# Patient Record
Sex: MALE | Race: OTHER | HISPANIC OR LATINO | URBAN - METROPOLITAN AREA
[De-identification: names, ages, dates, MRNs, and addresses within clinical notes are randomized per-mention and may not be internally consistent; named-entity substitution may affect disease eponyms.]

---

## 2021-10-21 ENCOUNTER — EMERGENCY (EMERGENCY)
Facility: HOSPITAL | Age: 46
LOS: 1 days | Discharge: ANOTHER HOSPITAL | End: 2021-10-21
Attending: EMERGENCY MEDICINE
Payer: MEDICAID

## 2021-10-21 VITALS
HEART RATE: 45 BPM | OXYGEN SATURATION: 98 % | TEMPERATURE: 98 F | DIASTOLIC BLOOD PRESSURE: 50 MMHG | WEIGHT: 260.15 LBS | RESPIRATION RATE: 18 BRPM | SYSTOLIC BLOOD PRESSURE: 80 MMHG

## 2021-10-21 PROCEDURE — 99291 CRITICAL CARE FIRST HOUR: CPT

## 2021-10-21 PROCEDURE — 93010 ELECTROCARDIOGRAM REPORT: CPT

## 2021-10-22 ENCOUNTER — INPATIENT (INPATIENT)
Facility: HOSPITAL | Age: 46
LOS: 0 days | Discharge: HOME | DRG: 308 | End: 2021-10-23
Attending: INTERNAL MEDICINE | Admitting: INTERNAL MEDICINE
Payer: MEDICAID

## 2021-10-22 ENCOUNTER — FORM ENCOUNTER (OUTPATIENT)
Age: 46
End: 2021-10-22

## 2021-10-22 VITALS
OXYGEN SATURATION: 96 % | DIASTOLIC BLOOD PRESSURE: 49 MMHG | SYSTOLIC BLOOD PRESSURE: 97 MMHG | RESPIRATION RATE: 16 BRPM

## 2021-10-22 VITALS
WEIGHT: 266.98 LBS | HEIGHT: 68 IN | SYSTOLIC BLOOD PRESSURE: 91 MMHG | RESPIRATION RATE: 22 BRPM | OXYGEN SATURATION: 98 % | HEART RATE: 48 BPM | DIASTOLIC BLOOD PRESSURE: 55 MMHG

## 2021-10-22 DIAGNOSIS — R00.1 BRADYCARDIA, UNSPECIFIED: ICD-10-CM

## 2021-10-22 LAB
A1C WITH ESTIMATED AVERAGE GLUCOSE RESULT: 6.5 % — HIGH (ref 4–5.6)
ALBUMIN SERPL ELPH-MCNC: 2.9 G/DL — LOW (ref 3.5–5)
ALBUMIN SERPL ELPH-MCNC: 3.6 G/DL — SIGNIFICANT CHANGE UP (ref 3.3–5)
ALP SERPL-CCNC: 107 U/L — SIGNIFICANT CHANGE UP (ref 40–120)
ALP SERPL-CCNC: 109 U/L — SIGNIFICANT CHANGE UP (ref 40–120)
ALT FLD-CCNC: 149 U/L — HIGH (ref 10–45)
ALT FLD-CCNC: 161 U/L DA — HIGH (ref 10–60)
ANION GAP SERPL CALC-SCNC: 13 MMOL/L — SIGNIFICANT CHANGE UP (ref 5–17)
ANION GAP SERPL CALC-SCNC: 8 MMOL/L — SIGNIFICANT CHANGE UP (ref 5–17)
APTT BLD: 27.2 SEC — LOW (ref 27.5–35.5)
APTT BLD: 29 SEC — SIGNIFICANT CHANGE UP (ref 27.5–35.5)
AST SERPL-CCNC: 72 U/L — HIGH (ref 10–40)
AST SERPL-CCNC: 91 U/L — HIGH (ref 10–40)
BASOPHILS # BLD AUTO: 0.08 K/UL — SIGNIFICANT CHANGE UP (ref 0–0.2)
BASOPHILS NFR BLD AUTO: 0.5 % — SIGNIFICANT CHANGE UP (ref 0–2)
BILIRUB SERPL-MCNC: 0.5 MG/DL — SIGNIFICANT CHANGE UP (ref 0.2–1.2)
BILIRUB SERPL-MCNC: 0.5 MG/DL — SIGNIFICANT CHANGE UP (ref 0.2–1.2)
BLD GP AB SCN SERPL QL: NEGATIVE — SIGNIFICANT CHANGE UP
BUN SERPL-MCNC: 25 MG/DL — HIGH (ref 7–18)
BUN SERPL-MCNC: 28 MG/DL — HIGH (ref 7–23)
CALCIUM SERPL-MCNC: 8.7 MG/DL — SIGNIFICANT CHANGE UP (ref 8.4–10.5)
CALCIUM SERPL-MCNC: 8.8 MG/DL — SIGNIFICANT CHANGE UP (ref 8.4–10.5)
CHLORIDE SERPL-SCNC: 106 MMOL/L — SIGNIFICANT CHANGE UP (ref 96–108)
CHLORIDE SERPL-SCNC: 109 MMOL/L — HIGH (ref 96–108)
CHOLEST SERPL-MCNC: 146 MG/DL — SIGNIFICANT CHANGE UP
CK MB BLD-MCNC: 2.2 % — SIGNIFICANT CHANGE UP (ref 0–3.5)
CK MB CFR SERPL CALC: 2.9 NG/ML — SIGNIFICANT CHANGE UP (ref 0–6.7)
CK SERPL-CCNC: 132 U/L — SIGNIFICANT CHANGE UP (ref 30–200)
CO2 SERPL-SCNC: 17 MMOL/L — LOW (ref 22–31)
CO2 SERPL-SCNC: 23 MMOL/L — SIGNIFICANT CHANGE UP (ref 22–31)
COVID-19 NUCLEOCAPSID GAM AB INTERP: NEGATIVE — SIGNIFICANT CHANGE UP
COVID-19 NUCLEOCAPSID TOTAL GAM ANTIBODY RESULT: 0.06 INDEX — SIGNIFICANT CHANGE UP
COVID-19 SPIKE DOMAIN AB INTERP: POSITIVE
COVID-19 SPIKE DOMAIN ANTIBODY RESULT: 176 U/ML — HIGH
CREAT SERPL-MCNC: 2.14 MG/DL — HIGH (ref 0.5–1.3)
CREAT SERPL-MCNC: 2.66 MG/DL — HIGH (ref 0.5–1.3)
EOSINOPHIL # BLD AUTO: 0.27 K/UL — SIGNIFICANT CHANGE UP (ref 0–0.5)
EOSINOPHIL NFR BLD AUTO: 1.8 % — SIGNIFICANT CHANGE UP (ref 0–6)
ESTIMATED AVERAGE GLUCOSE: 140 MG/DL — HIGH (ref 68–114)
GAS PNL BLDA: SIGNIFICANT CHANGE UP
GAS PNL BLDA: SIGNIFICANT CHANGE UP
GLUCOSE BLDC GLUCOMTR-MCNC: 137 MG/DL — HIGH (ref 70–99)
GLUCOSE SERPL-MCNC: 138 MG/DL — HIGH (ref 70–99)
GLUCOSE SERPL-MCNC: 169 MG/DL — HIGH (ref 70–99)
HCT VFR BLD CALC: 38.8 % — LOW (ref 39–50)
HCT VFR BLD CALC: 39.4 % — SIGNIFICANT CHANGE UP (ref 39–50)
HDLC SERPL-MCNC: 38 MG/DL — LOW
HGB BLD-MCNC: 12.6 G/DL — LOW (ref 13–17)
HGB BLD-MCNC: 13 G/DL — SIGNIFICANT CHANGE UP (ref 13–17)
IMM GRANULOCYTES NFR BLD AUTO: 1 % — SIGNIFICANT CHANGE UP (ref 0–1.5)
INR BLD: 1.04 RATIO — SIGNIFICANT CHANGE UP (ref 0.88–1.16)
INR BLD: 1.07 RATIO — SIGNIFICANT CHANGE UP (ref 0.88–1.16)
LACTATE SERPL-SCNC: 2.2 MMOL/L — HIGH (ref 0.7–2)
LIDOCAIN IGE QN: 205 U/L — SIGNIFICANT CHANGE UP (ref 73–393)
LIPID PNL WITH DIRECT LDL SERPL: 86 MG/DL — SIGNIFICANT CHANGE UP
LYMPHOCYTES # BLD AUTO: 18.6 % — SIGNIFICANT CHANGE UP (ref 13–44)
LYMPHOCYTES # BLD AUTO: 2.85 K/UL — SIGNIFICANT CHANGE UP (ref 1–3.3)
MAGNESIUM SERPL-MCNC: 2.1 MG/DL — SIGNIFICANT CHANGE UP (ref 1.6–2.6)
MAGNESIUM SERPL-MCNC: 2.3 MG/DL — SIGNIFICANT CHANGE UP (ref 1.6–2.6)
MCHC RBC-ENTMCNC: 27.9 PG — SIGNIFICANT CHANGE UP (ref 27–34)
MCHC RBC-ENTMCNC: 28.1 PG — SIGNIFICANT CHANGE UP (ref 27–34)
MCHC RBC-ENTMCNC: 32.5 GM/DL — SIGNIFICANT CHANGE UP (ref 32–36)
MCHC RBC-ENTMCNC: 33 GM/DL — SIGNIFICANT CHANGE UP (ref 32–36)
MCV RBC AUTO: 85.3 FL — SIGNIFICANT CHANGE UP (ref 80–100)
MCV RBC AUTO: 86 FL — SIGNIFICANT CHANGE UP (ref 80–100)
MONOCYTES # BLD AUTO: 1.01 K/UL — HIGH (ref 0–0.9)
MONOCYTES NFR BLD AUTO: 6.6 % — SIGNIFICANT CHANGE UP (ref 2–14)
NEUTROPHILS # BLD AUTO: 10.96 K/UL — HIGH (ref 1.8–7.4)
NEUTROPHILS NFR BLD AUTO: 71.5 % — SIGNIFICANT CHANGE UP (ref 43–77)
NON HDL CHOLESTEROL: 108 MG/DL — SIGNIFICANT CHANGE UP
NRBC # BLD: 0 /100 WBCS — SIGNIFICANT CHANGE UP (ref 0–0)
NRBC # BLD: 0 /100 WBCS — SIGNIFICANT CHANGE UP (ref 0–0)
NT-PROBNP SERPL-SCNC: 1531 PG/ML — HIGH (ref 0–125)
PHOSPHATE SERPL-MCNC: 4.5 MG/DL — SIGNIFICANT CHANGE UP (ref 2.5–4.5)
PLATELET # BLD AUTO: 297 K/UL — SIGNIFICANT CHANGE UP (ref 150–400)
PLATELET # BLD AUTO: 300 K/UL — SIGNIFICANT CHANGE UP (ref 150–400)
POTASSIUM SERPL-MCNC: 4.2 MMOL/L — SIGNIFICANT CHANGE UP (ref 3.5–5.3)
POTASSIUM SERPL-MCNC: 4.6 MMOL/L — SIGNIFICANT CHANGE UP (ref 3.5–5.3)
POTASSIUM SERPL-SCNC: 4.2 MMOL/L — SIGNIFICANT CHANGE UP (ref 3.5–5.3)
POTASSIUM SERPL-SCNC: 4.6 MMOL/L — SIGNIFICANT CHANGE UP (ref 3.5–5.3)
PROT SERPL-MCNC: 6.6 G/DL — SIGNIFICANT CHANGE UP (ref 6–8.3)
PROT SERPL-MCNC: 6.7 G/DL — SIGNIFICANT CHANGE UP (ref 6–8.3)
PROTHROM AB SERPL-ACNC: 12.5 SEC — SIGNIFICANT CHANGE UP (ref 10.6–13.6)
PROTHROM AB SERPL-ACNC: 12.7 SEC — SIGNIFICANT CHANGE UP (ref 10.6–13.6)
RBC # BLD: 4.51 M/UL — SIGNIFICANT CHANGE UP (ref 4.2–5.8)
RBC # BLD: 4.62 M/UL — SIGNIFICANT CHANGE UP (ref 4.2–5.8)
RBC # FLD: 14.9 % — HIGH (ref 10.3–14.5)
RBC # FLD: 15 % — HIGH (ref 10.3–14.5)
RH IG SCN BLD-IMP: POSITIVE — SIGNIFICANT CHANGE UP
SARS-COV-2 IGG+IGM SERPL QL IA: 0.06 INDEX — SIGNIFICANT CHANGE UP
SARS-COV-2 IGG+IGM SERPL QL IA: 176 U/ML — HIGH
SARS-COV-2 IGG+IGM SERPL QL IA: NEGATIVE — SIGNIFICANT CHANGE UP
SARS-COV-2 IGG+IGM SERPL QL IA: POSITIVE
SARS-COV-2 RNA SPEC QL NAA+PROBE: SIGNIFICANT CHANGE UP
SODIUM SERPL-SCNC: 136 MMOL/L — SIGNIFICANT CHANGE UP (ref 135–145)
SODIUM SERPL-SCNC: 140 MMOL/L — SIGNIFICANT CHANGE UP (ref 135–145)
TRIGL SERPL-MCNC: 108 MG/DL — SIGNIFICANT CHANGE UP
TROPONIN I, HIGH SENSITIVITY RESULT: 14.4 NG/L — SIGNIFICANT CHANGE UP
TROPONIN T, HIGH SENSITIVITY RESULT: 21 NG/L — SIGNIFICANT CHANGE UP (ref 0–51)
TSH SERPL-MCNC: 0.94 UIU/ML — SIGNIFICANT CHANGE UP (ref 0.27–4.2)
TSH SERPL-MCNC: 3.18 UU/ML — SIGNIFICANT CHANGE UP (ref 0.34–4.82)
WBC # BLD: 15.32 K/UL — HIGH (ref 3.8–10.5)
WBC # BLD: 18.23 K/UL — HIGH (ref 3.8–10.5)
WBC # FLD AUTO: 15.32 K/UL — HIGH (ref 3.8–10.5)
WBC # FLD AUTO: 18.23 K/UL — HIGH (ref 3.8–10.5)

## 2021-10-22 PROCEDURE — 83690 ASSAY OF LIPASE: CPT

## 2021-10-22 PROCEDURE — 85025 COMPLETE CBC W/AUTO DIFF WBC: CPT

## 2021-10-22 PROCEDURE — 99291 CRITICAL CARE FIRST HOUR: CPT

## 2021-10-22 PROCEDURE — 87635 SARS-COV-2 COVID-19 AMP PRB: CPT

## 2021-10-22 PROCEDURE — 36415 COLL VENOUS BLD VENIPUNCTURE: CPT

## 2021-10-22 PROCEDURE — 96376 TX/PRO/DX INJ SAME DRUG ADON: CPT

## 2021-10-22 PROCEDURE — 87040 BLOOD CULTURE FOR BACTERIA: CPT

## 2021-10-22 PROCEDURE — 71045 X-RAY EXAM CHEST 1 VIEW: CPT | Mod: 26

## 2021-10-22 PROCEDURE — 93005 ELECTROCARDIOGRAM TRACING: CPT

## 2021-10-22 PROCEDURE — 85610 PROTHROMBIN TIME: CPT

## 2021-10-22 PROCEDURE — 99291 CRITICAL CARE FIRST HOUR: CPT | Mod: 25

## 2021-10-22 PROCEDURE — 71045 X-RAY EXAM CHEST 1 VIEW: CPT | Mod: 26,77

## 2021-10-22 PROCEDURE — 84484 ASSAY OF TROPONIN QUANT: CPT

## 2021-10-22 PROCEDURE — 83880 ASSAY OF NATRIURETIC PEPTIDE: CPT

## 2021-10-22 PROCEDURE — 93306 TTE W/DOPPLER COMPLETE: CPT | Mod: 26

## 2021-10-22 PROCEDURE — 93010 ELECTROCARDIOGRAM REPORT: CPT | Mod: 76

## 2021-10-22 PROCEDURE — 83605 ASSAY OF LACTIC ACID: CPT

## 2021-10-22 PROCEDURE — 93010 ELECTROCARDIOGRAM REPORT: CPT

## 2021-10-22 PROCEDURE — 96375 TX/PRO/DX INJ NEW DRUG ADDON: CPT

## 2021-10-22 PROCEDURE — 80053 COMPREHEN METABOLIC PANEL: CPT

## 2021-10-22 PROCEDURE — 86850 RBC ANTIBODY SCREEN: CPT

## 2021-10-22 PROCEDURE — 86900 BLOOD TYPING SEROLOGIC ABO: CPT

## 2021-10-22 PROCEDURE — 71045 X-RAY EXAM CHEST 1 VIEW: CPT

## 2021-10-22 PROCEDURE — 86901 BLOOD TYPING SEROLOGIC RH(D): CPT

## 2021-10-22 PROCEDURE — 85730 THROMBOPLASTIN TIME PARTIAL: CPT

## 2021-10-22 PROCEDURE — 83735 ASSAY OF MAGNESIUM: CPT

## 2021-10-22 PROCEDURE — 96374 THER/PROPH/DIAG INJ IV PUSH: CPT

## 2021-10-22 PROCEDURE — 84443 ASSAY THYROID STIM HORMONE: CPT

## 2021-10-22 RX ORDER — ISOPROTERENOL HYDROCHLORIDE 1 MG/5ML
5 INJECTION, SOLUTION INTRACARDIAC; INTRAMUSCULAR; INTRAVENOUS; SUBCUTANEOUS
Qty: 1 | Refills: 0 | Status: DISCONTINUED | OUTPATIENT
Start: 2021-10-22 | End: 2021-10-22

## 2021-10-22 RX ORDER — DEXTROSE 50 % IN WATER 50 %
25 SYRINGE (ML) INTRAVENOUS ONCE
Refills: 0 | Status: DISCONTINUED | OUTPATIENT
Start: 2021-10-22 | End: 2021-10-23

## 2021-10-22 RX ORDER — ONDANSETRON 8 MG/1
4 TABLET, FILM COATED ORAL ONCE
Refills: 0 | Status: COMPLETED | OUTPATIENT
Start: 2021-10-22 | End: 2021-10-22

## 2021-10-22 RX ORDER — DOPAMINE HYDROCHLORIDE 40 MG/ML
10 INJECTION, SOLUTION, CONCENTRATE INTRAVENOUS
Qty: 400 | Refills: 0 | Status: DISCONTINUED | OUTPATIENT
Start: 2021-10-22 | End: 2021-10-25

## 2021-10-22 RX ORDER — DEXTROSE 50 % IN WATER 50 %
15 SYRINGE (ML) INTRAVENOUS ONCE
Refills: 0 | Status: DISCONTINUED | OUTPATIENT
Start: 2021-10-22 | End: 2021-10-23

## 2021-10-22 RX ORDER — CHLORHEXIDINE GLUCONATE 213 G/1000ML
1 SOLUTION TOPICAL
Refills: 0 | Status: DISCONTINUED | OUTPATIENT
Start: 2021-10-22 | End: 2021-10-23

## 2021-10-22 RX ORDER — GLUCAGON INJECTION, SOLUTION 0.5 MG/.1ML
1 INJECTION, SOLUTION SUBCUTANEOUS ONCE
Refills: 0 | Status: DISCONTINUED | OUTPATIENT
Start: 2021-10-22 | End: 2021-10-23

## 2021-10-22 RX ORDER — ATROPINE SULFATE 0.1 MG/ML
1 SYRINGE (ML) INJECTION ONCE
Refills: 0 | Status: COMPLETED | OUTPATIENT
Start: 2021-10-22 | End: 2021-10-22

## 2021-10-22 RX ORDER — FUROSEMIDE 40 MG
80 TABLET ORAL ONCE
Refills: 0 | Status: COMPLETED | OUTPATIENT
Start: 2021-10-22 | End: 2021-10-22

## 2021-10-22 RX ORDER — HEPARIN SODIUM 5000 [USP'U]/ML
5000 INJECTION INTRAVENOUS; SUBCUTANEOUS EVERY 8 HOURS
Refills: 0 | Status: DISCONTINUED | OUTPATIENT
Start: 2021-10-22 | End: 2021-10-23

## 2021-10-22 RX ORDER — METOCLOPRAMIDE HCL 10 MG
10 TABLET ORAL ONCE
Refills: 0 | Status: COMPLETED | OUTPATIENT
Start: 2021-10-22 | End: 2021-10-22

## 2021-10-22 RX ORDER — ATROPINE SULFATE 0.1 MG/ML
0.5 SYRINGE (ML) INJECTION ONCE
Refills: 0 | Status: COMPLETED | OUTPATIENT
Start: 2021-10-22 | End: 2021-10-22

## 2021-10-22 RX ORDER — CALCIUM GLUCONATE 100 MG/ML
2 VIAL (ML) INTRAVENOUS ONCE
Refills: 0 | Status: COMPLETED | OUTPATIENT
Start: 2021-10-22 | End: 2021-10-22

## 2021-10-22 RX ORDER — GLUCAGON INJECTION, SOLUTION 0.5 MG/.1ML
5 INJECTION, SOLUTION SUBCUTANEOUS ONCE
Refills: 0 | Status: COMPLETED | OUTPATIENT
Start: 2021-10-22 | End: 2021-10-22

## 2021-10-22 RX ORDER — SODIUM CHLORIDE 9 MG/ML
1000 INJECTION INTRAMUSCULAR; INTRAVENOUS; SUBCUTANEOUS ONCE
Refills: 0 | Status: COMPLETED | OUTPATIENT
Start: 2021-10-22 | End: 2021-10-22

## 2021-10-22 RX ORDER — DOPAMINE HYDROCHLORIDE 40 MG/ML
12 INJECTION, SOLUTION, CONCENTRATE INTRAVENOUS
Qty: 400 | Refills: 0 | Status: DISCONTINUED | OUTPATIENT
Start: 2021-10-22 | End: 2021-10-22

## 2021-10-22 RX ORDER — INFLUENZA VIRUS VACCINE 15; 15; 15; 15 UG/.5ML; UG/.5ML; UG/.5ML; UG/.5ML
0.5 SUSPENSION INTRAMUSCULAR ONCE
Refills: 0 | Status: COMPLETED | OUTPATIENT
Start: 2021-10-22 | End: 2021-10-23

## 2021-10-22 RX ORDER — INSULIN LISPRO 100/ML
VIAL (ML) SUBCUTANEOUS AT BEDTIME
Refills: 0 | Status: DISCONTINUED | OUTPATIENT
Start: 2021-10-22 | End: 2021-10-23

## 2021-10-22 RX ORDER — DOPAMINE HYDROCHLORIDE 40 MG/ML
10 INJECTION, SOLUTION, CONCENTRATE INTRAVENOUS
Qty: 400 | Refills: 0 | Status: DISCONTINUED | OUTPATIENT
Start: 2021-10-22 | End: 2021-10-22

## 2021-10-22 RX ORDER — INSULIN LISPRO 100/ML
VIAL (ML) SUBCUTANEOUS
Refills: 0 | Status: DISCONTINUED | OUTPATIENT
Start: 2021-10-22 | End: 2021-10-23

## 2021-10-22 RX ADMIN — ONDANSETRON 4 MILLIGRAM(S): 8 TABLET, FILM COATED ORAL at 00:28

## 2021-10-22 RX ADMIN — ISOPROTERENOL HYDROCHLORIDE 75 MICROGRAM(S)/MIN: 1 INJECTION, SOLUTION INTRACARDIAC; INTRAMUSCULAR; INTRAVENOUS; SUBCUTANEOUS at 07:16

## 2021-10-22 RX ADMIN — ONDANSETRON 4 MILLIGRAM(S): 8 TABLET, FILM COATED ORAL at 01:14

## 2021-10-22 RX ADMIN — ONDANSETRON 4 MILLIGRAM(S): 8 TABLET, FILM COATED ORAL at 03:11

## 2021-10-22 RX ADMIN — GLUCAGON INJECTION, SOLUTION 5 MILLIGRAM(S): 0.5 INJECTION, SOLUTION SUBCUTANEOUS at 01:08

## 2021-10-22 RX ADMIN — DOPAMINE HYDROCHLORIDE 44.3 MICROGRAM(S)/KG/MIN: 40 INJECTION, SOLUTION, CONCENTRATE INTRAVENOUS at 02:24

## 2021-10-22 RX ADMIN — Medication 0.5 MILLIGRAM(S): at 00:26

## 2021-10-22 RX ADMIN — HEPARIN SODIUM 5000 UNIT(S): 5000 INJECTION INTRAVENOUS; SUBCUTANEOUS at 21:33

## 2021-10-22 RX ADMIN — SODIUM CHLORIDE 1000 MILLILITER(S): 9 INJECTION INTRAMUSCULAR; INTRAVENOUS; SUBCUTANEOUS at 01:30

## 2021-10-22 RX ADMIN — Medication 80 MILLIGRAM(S): at 16:27

## 2021-10-22 RX ADMIN — Medication 10 MILLIGRAM(S): at 03:18

## 2021-10-22 RX ADMIN — SODIUM CHLORIDE 1000 MILLILITER(S): 9 INJECTION INTRAMUSCULAR; INTRAVENOUS; SUBCUTANEOUS at 00:28

## 2021-10-22 RX ADMIN — Medication 200 GRAM(S): at 00:33

## 2021-10-22 RX ADMIN — DOPAMINE HYDROCHLORIDE 54.5 MICROGRAM(S)/KG/MIN: 40 INJECTION, SOLUTION, CONCENTRATE INTRAVENOUS at 10:09

## 2021-10-22 RX ADMIN — SODIUM CHLORIDE 1000 MILLILITER(S): 9 INJECTION INTRAMUSCULAR; INTRAVENOUS; SUBCUTANEOUS at 00:32

## 2021-10-22 RX ADMIN — Medication 80 MILLIGRAM(S): at 06:25

## 2021-10-22 RX ADMIN — Medication 1 MILLIGRAM(S): at 00:58

## 2021-10-22 RX ADMIN — SODIUM CHLORIDE 1000 MILLILITER(S): 9 INJECTION INTRAMUSCULAR; INTRAVENOUS; SUBCUTANEOUS at 01:40

## 2021-10-22 RX ADMIN — HEPARIN SODIUM 5000 UNIT(S): 5000 INJECTION INTRAVENOUS; SUBCUTANEOUS at 13:35

## 2021-10-22 RX ADMIN — CHLORHEXIDINE GLUCONATE 1 APPLICATION(S): 213 SOLUTION TOPICAL at 06:25

## 2021-10-22 NOTE — CONSULT NOTE ADULT - ASSESSMENT
Patient is a 46 year old male with a significant PMH HTN (on enalapril and PRN atenolol) presenting with dizziness/lightheadedness, found with sinus arrest w/ wide complex bradycardia. Bradycardia initially tx with glucagon for concern for beta blocker overdose which was unsuccessful, given atropine and calcium without any response. Would expect residual BB effect to have dissipated by now. Wide complex escape in the setting of sinus arrest, no prior ECG to determine if patient w/ baseline RBBB during NSR.     INCOMPLETE - to discuss with attending  Plan  -avoid AVNB  -HD stable, on dopamine 5mcg and isuprel 1mcg  -f/u Lyme studies  -continue to monitor, wean dopamine/isuprel as tolerated  -monitor for infection in the setting of leukocytosis and episode of watery stool yesterday; consider GI PCR if watery stools persist Patient is a 46 year old male with a significant PMH HTN (on enalapril and PRN atenolol) presenting with dizziness/lightheadedness, found with sinus arrest w/ wide complex bradycardia. Bradycardia initially tx with glucagon for concern for beta blocker overdose which was unsuccessful, given atropine and calcium without any response. Currently in NSR. Episode likely vagally-mediated in the setting of nausea with additional contribution from atenolol. Patient with large habitus and reported snoring, so likely MARCIA which is a factor to not only his sinus arrest but also HTN     Plan  -avoid AVNB and dihydropyridine CCB; can restart ACE if needed  -HD stable, off dopamine and isuprel; if an agent needs to be restarted, would titrate isuprel  -f/u Lyme studies  -monitor for infection in the setting of leukocytosis and episode of watery stool yesterday; consider GI PCR if watery stools persist  -[employer] for 2 weeks  -patient will need sleep study; this may be pursued outpatient

## 2021-10-22 NOTE — ED PROVIDER NOTE - CLINICAL SUMMARY MEDICAL DECISION MAKING FREE TEXT BOX
46 year old male with dizziness. bradycardic. PE as above.  labs, cxr, ecg, bradycardia medications, cardiology consult, admission

## 2021-10-22 NOTE — H&P ADULT - ASSESSMENT
Mr. Paiz is a 45 y/o male with a significant PMHx of HTN (on enalapril and prn atenolol) who is presenting as a transfer from [facility] in the setting of symptomatic bradycardia complicated by cardiogenic shock requiring dopamine infusion.     Neuro:    -Mentating appropriately, not encephalopathic, not on any sedation/sedatives.   -CHARLI.     CV:    #Symptomatic Bradycardia:  -Per EKG patient's bradycardia appears to be sinus vs non-sinus vs narrow complex vs wide complex bradycardia. Bradycardia initially tx with glucagon for concern for beta blocker overdose which was unsuccessful, given atropine without any response, given calcium without any response. Therefore patient's bradycardia most likely not vagally mediated or not calcium responsive etiology including (hyperkalemia, hypocalcemia, hypermagnesemia, CCB induced). Could have high grade AV block?    -Patient symptomatic and given elevated lactate, hypotension appears to be in cardiogenic shock from bradycardia, started on dopamine which improved patient's hypotension however HR remains bradycardic.   -TSH wnl, if evidence of heart block AV node block would get lyme titers, ACE level, would hold off on other w/u for infiltrate causes.   -Obtain echo to eval for possible underlying cardiomyopathy  -Would repeat troponin and pro-BNP   -If persistently bradycardic may need transcutaneous pacing vs TVP?    #Cardiogenic Shock:  -Secondary to bradycardia and decreased cardiac output.   -Monitor perfusion markers including lactate, AST/ALT  -C/w dopamine at moderate dose for inotropic support consider switching to isoproterenol,  poor choice given hypotension, epi can be considered as well.   -May have pulmonary edema from shock state and 2L fluids administered in the ED,  consider gentle diuresis once BP optimized.     Pulm:  -On 3L RA, no documented signs of hypoxia on flowsheets. CXR with signs of bibasilar reticular opacities possibly atelectasis vs. pulmonary edema from shock state.     GI:  #Transaminitis:  -Marker of perfusion, most likely i/s/o poor forward perfusion from cardiogenic shock vs. onset of congestive hepatopathy from cardiogenic shock state. CTM and will treat shock state to optimize.     /Renal:    #Elevated SCr:  -Unsure what patient's baseline is, if CHRISTIANO most likely hemodynamically mediated ATN vs. pre-renal azotemia from possible low ECV from bradycardia and hypotension, optimizing shock state may help mediate renal recovery.   -Monitor I's and O's  -Consider escobar placement for UOP.     Endo/Rheum:    -CHARLI    Heme/onc:    #Anemia:  -MIldly anemic can consider iron studies when more stable to eval.     #Leukocytosis:  -Reactive most likely  -CTM    ID:  -CHARLI    DVT ppx  -Would hold i/s/o possible intervention for bradycardia.

## 2021-10-22 NOTE — ED PROVIDER NOTE - OBJECTIVE STATEMENT
46 year old male PMH HTN (on daily enalapril and PRN atenolol unsure of doses) coming in with dizziness/light headed sensation. states he felt like this earlier and he took his enalapril- states still with symptoms and assumed it was because his BP was elevated so he took the atenolol his doctor told him to take if he had elevated bp. states then made him feel worse. never had symptoms like this before. denies cp, sob, palpitations, abd pains, n/V/D/C, urinary complaints, uri symptoms, cough, ha, numbness, tingling, weakness, slurred speech.

## 2021-10-22 NOTE — H&P ADULT - NSHPPHYSICALEXAM_GEN_ALL_CORE
Vital Signs Last 24 Hrs  T(C): 36.6 (21 Oct 2021 23:19), Max: 36.6 (21 Oct 2021 23:19)  T(F): 97.9 (21 Oct 2021 23:19), Max: 97.9 (21 Oct 2021 23:19)  HR: 47 (22 Oct 2021 05:30) (45 - 49)  BP: 91/55 (22 Oct 2021 05:26) (80/50 - 110/58)  BP(mean): 69 (22 Oct 2021 05:26) (69 - 69)  RR: 22 (22 Oct 2021 06:27) (16 - 23)  SpO2: 94% (22 Oct 2021 06:27) (94% - 100%)    General: + lethargic and sleepy, awakens with verbal or physical stimuli.   HEENT: EOMI, PERRLA.  Neck: Supple, +JVP  Lungs: +bibasilar crackles.   Heart: + bradycardic to auscultation and palpation. No murmurs. No thrills or heaves.   Abdomen: Soft, non-tender, non-distended, normal bowel sounds,   Extremities: 1+ pitting edema in b/l LE to mid shin.   Skin: + skin tags on neck.   Neuro: strength and sensation grossly intact.

## 2021-10-22 NOTE — PATIENT PROFILE ADULT - AGENT'S NAME
2 weeks Carmita Bobby (significant other) Carmita Patrick (significant other) Janet Patrick (significant other)

## 2021-10-22 NOTE — ED PROVIDER NOTE - PROGRESS NOTE DETAILS
initially patient given glucagon for possible beta blocker overdose, calcium for possible hyperK, and atropine for bradycardia and 2L NS bolus. BP improved but HR consistently bradycardic. ECG with wide complex bradycardia. cxr clear. labs with reduced renal function and elevated wbc. cardiology consulted- more likely beta blocker related- advised start on dopamine and then reassess in 1 hour for likely transfer. Pt to be transferred to [facility] to CCU. on dopamine improved BP but HR still bradycardic.

## 2021-10-22 NOTE — H&P ADULT - ASSESSMENT
Mr. Paiz is a 47 y/o male with a significant PMHx of HTN (on enalapril and prn atenolol) who is presenting as a transfer from [facility] in the setting of symptomatic bradycardia complicated by cardiogenic shock requiring dopamine infusion.     Neuro:    -Mentating appropriately, not encephalopathic, not on any sedation/sedatives.   -CHARLI.     CV:    #Symptomatic Regular Wide Complex Bradycardia:  -Per EKG patient's bradycardia appears to be regular wide complex bradycardia without evidence of P waves on EKG. Bradycardia initially tx with glucagon for concern for beta blocker overdose which was unsuccessful, given atropine without any response, given calcium without any response. Therefore patient's bradycardia most likely not vagally mediated or not calcium responsive etiology including (hyperkalemia, hypocalcemia, hypermagnesemia, CCB induced). Could have idioventricular rhythm from EKG from failure of the SA and AV node.   -Etiology of idioventricular rhythm appears to either from electrolytes (repleted), ischemia (negative trop),   -Patient symptomatic and given elevated lactate, hypotension appears to be in cardiogenic shock from bradycardia, started on dopamine which improved patient's hypotension however HR remains bradycardic.   -TSH wnl, if evidence of heart block AV node block would get lyme titers, ACE level, possibly amyloid eval.   -Obtain echo to eval for possible underlying cardiomyopathy  -Would repeat troponin and pro-BNP   -If persistently bradycardic may need transcutaneous pacing vs TVP?    #Cardiogenic Shock:  -Secondary to bradycardia and decreased cardiac output.   -Monitor perfusion markers including lactate, AST/ALT  -C/w dopamine at moderate dose for inotropic support consider switching to isoproterenol,  poor choice given hypotension, epi can be considered as well as BP appears to be more chronotropically/HR mediated rather than pump related.    -May have pulmonary edema from shock state and 2L fluids administered in the ED,  consider diuresis with furosemide 80 mg IVP.     -?swan monitoring of CVP/CI?     Pulm:  -On 3L RA, no documented signs of hypoxia on flowsheets. CXR with signs of bibasilar reticular opacities possibly atelectasis vs. pulmonary edema from shock state.     GI:  #Transaminitis:  -Marker of perfusion, most likely i/s/o poor forward perfusion from cardiogenic shock vs. onset of congestive hepatopathy from cardiogenic shock state. CTM and will treat shock state to optimize.     /Renal:    #Elevated SCr:  -Unsure what patient's baseline is, if CHRISTIANO most likely hemodynamically mediated ATN vs. pre-renal azotemia from possible low ECV from bradycardia and hypotension, optimizing shock state may help mediate renal recovery.   -Monitor I's and O's. Bladder scan with PVR ~50cc.   -Consider escobar placement for UOP.     Endo/Rheum:    -CHARLI    Heme/onc:    #Anemia:  -MIldly anemic can consider iron studies when more stable to eval.     #Leukocytosis:  -Reactive most likely  -CTM    ID:  -CHARLI    DVT ppx  -Would hold i/s/o possible intervention for bradycardia.  Mr. Paiz is a 45 y/o male with a significant PMHx of HTN (on enalapril and prn atenolol) who is presenting as a transfer from [facility] in the setting of symptomatic bradycardia complicated by cardiogenic shock requiring dopamine infusion.     Neuro:    -Mentating appropriately, not encephalopathic, not on any sedation/sedatives.   -CHARLI.     CV:    #Symptomatic Regular Wide Complex Bradycardia:  -Per EKG patient's bradycardia appears to be regular wide complex bradycardia without evidence of P waves on EKG. Bradycardia initially tx with glucagon for concern for beta blocker overdose which was unsuccessful, given atropine without any response, given calcium without any response. Therefore patient's bradycardia most likely not vagally mediated or not calcium responsive etiology including (hyperkalemia, hypocalcemia, hypermagnesemia, CCB induced). Could have idioventricular rhythm from EKG from failure of the SA and AV node.   -Etiology of idioventricular rhythm appears to either from electrolytes (repleted), ischemia (negative trop),   -Patient symptomatic and given elevated lactate, hypotension appears to be in cardiogenic shock from bradycardia, started on dopamine which improved patient's hypotension however HR remains bradycardic.   -TSH wnl, if evidence of heart block AV node block would get lyme titers, ACE level, possibly amyloid eval.   -Obtain echo to eval for possible underlying cardiomyopathy  -Would repeat troponin and pro-BNP   -If persistently bradycardic may need transcutaneous pacing vs TVP?    #Cardiogenic Shock:  -Secondary to bradycardia and decreased cardiac output.   -Monitor perfusion markers including lactate, AST/ALT  -C/w dopamine at moderate dose for inotropic support consider switching to isoproterenol,  poor choice given hypotension, epi can be considered as well as BP appears to be more chronotropically/HR mediated rather than pump related.    -May have pulmonary edema from shock state and 2L fluids administered in the ED,  consider diuresis with furosemide 80 mg IVP.     -?swan monitoring of CVP/CI?     Pulm:  -On 3L RA, no documented signs of hypoxia on flowsheets. CXR with signs of bibasilar reticular opacities possibly atelectasis vs. pulmonary edema from shock state.   -Upon presentation patient found to be hypoxic to 82% on 3L, NC uptitrated to 15L with improvement in hypoxia to 90% now transitioned to HFNC 40/100 for PEEP along with oxygenation support.     GI:  #Transaminitis:  -Marker of perfusion, most likely i/s/o poor forward perfusion from cardiogenic shock vs. onset of congestive hepatopathy from cardiogenic shock state. CTM and will treat shock state to optimize.     /Renal:    #Elevated SCr:  -Unsure what patient's baseline is, if CHRISTIANO most likely hemodynamically mediated ATN vs. pre-renal azotemia from possible low ECV from bradycardia and hypotension, optimizing shock state may help mediate renal recovery.   -Monitor I's and O's. Bladder scan with PVR ~50cc.   -Consider escobar placement for UOP.     Endo/Rheum:    -CHARLI    Heme/onc:    #Anemia:  -MIldly anemic can consider iron studies when more stable to eval.     #Leukocytosis:  -Reactive most likely  -CTM    ID:  -CHARLI    DVT ppx  -Would hold i/s/o possible intervention for bradycardia.  Mr. Paiz is a 45 y/o male with a significant PMHx of HTN (on enalapril and prn atenolol) who is presenting as a transfer from [facility] in the setting of symptomatic bradycardia complicated by cardiogenic shock requiring dopamine infusion.     Neuro:    -Mentating appropriately, not encephalopathic, not on any sedation/sedatives.   -CHARLI.     CV:    #Symptomatic Regular Wide Complex Bradycardia:  -Per EKG patient's bradycardia appears to be regular wide complex bradycardia without evidence of P waves on EKG. Bradycardia initially tx with glucagon for concern for beta blocker overdose which was unsuccessful, given atropine without any response, given calcium without any response. Therefore patient's bradycardia most likely not vagally mediated or not calcium responsive etiology including (hyperkalemia, hypocalcemia, hypermagnesemia, CCB induced). Could have idioventricular rhythm from EKG from failure of the SA and AV node.   -Etiology of idioventricular rhythm appears to either from electrolytes (repleted), ischemia (negative trop),   -Patient symptomatic and given elevated lactate, hypotension appears to be in cardiogenic shock from bradycardia, started on dopamine which improved patient's hypotension however HR remains bradycardic.   -TSH wnl, if evidence of heart block AV node block would get lyme titers, ACE level, possibly amyloid eval.   -Obtain echo to eval for possible underlying cardiomyopathy  -Would repeat troponin and pro-BNP   -If persistently bradycardic may need transcutaneous pacing vs TVP?  -Hold atenolol i/s/o bradycardia.     #Cardiogenic Shock:  -Secondary to bradycardia and decreased cardiac output.   -Monitor perfusion markers including lactate, AST/ALT  -C/w dopamine at moderate dose for inotropic support consider switching to isoproterenol,  poor choice given hypotension, epi can be considered as well as BP appears to be more chronotropically/HR mediated rather than pump related.    -May have pulmonary edema from shock state and 2L fluids administered in the ED,  consider diuresis with furosemide 80 mg IVP.     -?swan monitoring of CVP/CI?   -Hold enalapril i/s/o hypotension and elevated creatinine.     Pulm:  -On 3L RA, no documented signs of hypoxia on flowsheets. CXR with signs of bibasilar reticular opacities possibly atelectasis vs. pulmonary edema from shock state.   -Upon presentation patient found to be hypoxic to 82% on 3L, NC uptitrated to 15L with improvement in hypoxia to 90% now transitioned to HFNC 40/100 for PEEP along with oxygenation support.     GI:  #Transaminitis:  -Marker of perfusion, most likely i/s/o poor forward perfusion from cardiogenic shock vs. onset of congestive hepatopathy from cardiogenic shock state. CTM and will treat shock state to optimize.     /Renal:    #Elevated SCr:  -Unsure what patient's baseline is, if CHRISTIANO most likely hemodynamically mediated ATN vs. pre-renal azotemia from possible low ECV from bradycardia and hypotension, optimizing shock state may help mediate renal recovery.   -Monitor I's and O's. Bladder scan with PVR ~50cc.   -Consider escobar placement for UOP.     Endo/Rheum:    -CHARLI    Heme/onc:    #Anemia:  -MIldly anemic can consider iron studies when more stable to eval.     #Leukocytosis:  -Reactive most likely  -CTM    ID:  -CHARLI    DVT ppx  -Would hold i/s/o possible intervention for bradycardia.     Med rec:  Home meds of enalapril and atenolol, unsure of dosage of medications.

## 2021-10-22 NOTE — H&P ADULT - NSHPLABSRESULTS_GEN_ALL_CORE
LABS:                        12.6   15.32 )-----------( 300      ( 22 Oct 2021 00:46 )             38.8     10-22    140  |  109<H>  |  25<H>  ----------------------------<  138<H>  4.2   |  23  |  2.66<H>    Ca    8.8      22 Oct 2021 00:46  Mg     2.3     10-22    TPro  6.7  /  Alb  2.9<L>  /  TBili  0.5  /  DBili  x   /  AST  91<H>  /  ALT  161<H>  /  AlkPhos  107  10-22      PT/INR - ( 22 Oct 2021 00:46 )   PT: 12.7 sec;   INR: 1.07 ratio         PTT - ( 22 Oct 2021 00:46 )  PTT:29.0 sec            Microbiology     EKG:    RADIOLOGY & ADDITIONAL TESTS:

## 2021-10-22 NOTE — SBIRT NOTE ADULT - NSSBIRTBRIEFINTDET_GEN_A_CORE
Screening results were reviewed with the patient and patient was provided information about healthy guidelines and potential negative consequences associated with level of risk. Motivation and readiness to reduce or stop use was discussed and goals and activities to make changes were suggested offered. Patient is currently attending an outpatient substance misuse/mental health treatment program and declined additional resources.

## 2021-10-22 NOTE — H&P ADULT - HISTORY OF PRESENT ILLNESS
Mr. Paiz is a 46 year old male with a significant PMH HTN (on daily enalapril and PRN atenolol unsure of doses) coming in with dizziness/light headed sensation. He states that earlier today he felt lightheaded and believed it was because his blood pressure was elevated. As a result he took his enalapril and reportedly was still symptomatic it was because his BP was elevated so he took his normal dose of hispRN atenolol his doctor advised for him to, denies taking more than his recommended dose. Denies taking any other cardiogenic medications. After taking his atenolol patient felt worse and decided to get evaluated. Denies cp, sob, palpitations, abd pains, n/V/D/C, urinary complaints, uri symptoms, cough, ha, numbness, tingling, weakness, slurred speech.      Patient initially presented to [facility]. His vitals on presentation at [place] were T=97.9, HR-45, BP-80/50, RR-18, SpO2-98%. Patient was evaluated for symptomatic bradycardia, labs were significant for a leukocytosis, mild anemia, elevated SCr to 2.66, elevated AST 91 and ALT-161, normal HStroponin, elevated lactate 2.2, normal TSH, and elevated serum pro-BNP of ~1531.  Mr. Paiz is a 46 year old male with a significant PMH HTN (on daily enalapril and PRN atenolol unsure of doses) coming in with dizziness/light headed sensation. He states that earlier today he felt lightheaded and believed it was because his blood pressure was elevated. As a result he took his enalapril and reportedly was still symptomatic it was because his BP was elevated so he took his normal dose of hispRN atenolol his doctor advised for him to, denies taking more than his recommended dose. Denies taking any other cardiogenic medications. After taking his atenolol patient felt worse and decided to get evaluated. Denies cp, sob, palpitations, abd pains, n/V/D/C, urinary complaints, uri symptoms, cough, ha, numbness, tingling, weakness, slurred speech.      Patient initially presented to [facility]. His vitals on presentation at [place] were T=97.9, HR-45, BP-80/50, RR-18, SpO2-98%. Patient was evaluated for symptomatic bradycardia, labs were significant for a leukocytosis, mild anemia, elevated SCr to 2.66, elevated AST 91 and ALT-161, normal HStroponin, elevated lactate 2.2, normal TSH, and elevated serum pro-BNP of ~1531. Cardiology consulted at [facility] initially believed patient presenting in the setting of beta blocker toxicity started the patient on glucagon without improvement in patient's bradycardia, given that patient remaind persistently hypotensive decision was made to start the patient on dopamine and transfer to [facility] for further care of symptomatic bradycardia.

## 2021-10-22 NOTE — H&P ADULT - NSHPREVIEWOFSYSTEMS_GEN_ALL_CORE
Constitutional/General- Denies acute distress. Patient has been feeling well the last couple of weeks.   Eyes- No changes in vision, double vision, blurry vision, does not wear glasses.  ENT- No nasal congestion or rhinorrhea,  changes in hearing, does not wear hearing aids. No odynophagia or dysphagia.   Skin-Denies rashes.  Cardiovascular- Denies chest pain or heart palpitations. No orthopnea/PND.  Pulmonary- Denies shortness of breath, no cough. No pleuritic chest pain or hemoptysis.   Gastrointestinal- Denies nausea, vomiting, diarrhea, bloating, constipation, abdominal pain.  Genitourinary-Denies dysuria, frequency, or change in urine odor/appearance.   Musculoskeletal-Denies changes in strength, no joint tenderness or swelling.  Neurologic-Denies changes in memory. Denies headache. weakness, paresthesias, or imbalance.   Endocrine-Denies heat/cold intolerance, weight change, excessive sweating, or polydipsia/polyuria  Psychology-Denies changes in mood. Denies anxiety or depression.  Heme/Lymph-Denies easy bruising.

## 2021-10-22 NOTE — ED PROVIDER NOTE - NSSERVICENOTAVAIL_ED_A_ED
Pt calling stating that [facility] pharmacy is only filling prednisone 1mg daily and pt wonders why dose was changed. Pt states she is in a study [facility] [employer]  and cant change her meds at this time, pt has been taking prednisone 5mg daily since last appt. I explained Dr José did not change her dose to 1mg, he sent for 5mg daily until her appt in October.     I called [facility] and they did receive the prednisone 1mg take 5mg daily #150 that we sent on 9/18/19 but were only filling it for 1mg take 1 tab daily. They will correct this and get rx ready or pt to .  I let pt know what happened and that they were getting the correct script ready for pt to . Pt to call us back if she is still having trouble getting prednisone 5mg daily script.   Acute Coronary Intervention

## 2021-10-22 NOTE — H&P ADULT - NSHPLABSRESULTS_GEN_ALL_CORE
LABS:                        13.0   18.23 )-----------( 297      ( 22 Oct 2021 06:18 )             39.4     10-22    140  |  109<H>  |  25<H>  ----------------------------<  138<H>  4.2   |  23  |  2.66<H>    Ca    8.8      22 Oct 2021 00:46  Mg     2.3     10-22    TPro  6.7  /  Alb  2.9<L>  /  TBili  0.5  /  DBili  x   /  AST  91<H>  /  ALT  161<H>  /  AlkPhos  107  10-22      PT/INR - ( 22 Oct 2021 00:46 )   PT: 12.7 sec;   INR: 1.07 ratio         PTT - ( 22 Oct 2021 00:46 )  PTT:29.0 sec        ABG - ( 22 Oct 2021 06:23 )  pH, Arterial: 7.34  pH, Blood: x     /  pCO2: 38    /  pO2: 66    / HCO3: 20    / Base Excess: -4.8  /  SaO2: 92.7                Microbiology     EKG: Ventricular rhythm, no p waves, rate of 40, normal axis, RBBB, no signs of ST changes or TWI. QRS widened ~150s.     RADIOLOGY & ADDITIONAL TESTS:    CXR personally reviewed, evidence of infiltrates in bibasilar area no evidence of pleural effusions, R heart looks enlarged.

## 2021-10-22 NOTE — H&P ADULT - NSHPPHYSICALEXAM_GEN_ALL_CORE
Vital Signs Last 24 Hrs  T(C): 36.6 (21 Oct 2021 23:19), Max: 36.6 (21 Oct 2021 23:19)  T(F): 97.9 (21 Oct 2021 23:19), Max: 97.9 (21 Oct 2021 23:19)  HR: 49 (22 Oct 2021 03:05) (45 - 49)  BP: 97/49 (22 Oct 2021 04:14) (80/50 - 110/58)  BP(mean): --  RR: 16 (22 Oct 2021 04:14) (16 - 22)  SpO2: 96% (22 Oct 2021 04:14) (95% - 100%)    General: Alert and oriented to name, place, and date.   HEENT: EOMI, PERRLA, no nasal discharge, no sinus congestion ,MMM, no oral lesion.  Neck: Supple, no thyromegaly, no appreciable JVP.   Lungs: Good inspiratory effort, clear to auscultation bilaterally, no wheezes, rales, or rhonchi.   Heart: RRR, no murmurs/rubs/gallops. PMI in 5th IC space in mid-clavicular line.   Abdomen: Soft, non-tender, non-distended, normal bowel sounds, no Hepatosplenomegaly (HSM), no suprapubic tenderness.   Back: Exam limited 2/2 patient discomfort, but no spinal or paraspinal tenderness. No Costrovertebral angle tenderness (CVAT).   Extremities: No clubbing, cyanosis, or edema. Pulses 2+ in all extremities.   Skin: No rash, ecchymosis, petechiae, or nodules.  Neuro: CN II-XII intact. Normal tone.  Strength 5/5 in b/l upper and lower extremities. Sensation to light touch and cold temperature intact throughout. Reflexes 2+ in upper and lower extremities. Coordination intact. Gait not tested.

## 2021-10-22 NOTE — CONSULT NOTE ADULT - ATTENDING COMMENTS
He symptoms were preceded by GI distress which may have led to a higher vagal state, which was then worsened by atenolol and antihypertensives. Low blood pressure due to vagotonia and junctional rhythm with 1:1 retrograde conduction. He is now back in sinus and normotensive. he almost certainly has MARCIA as well. Would wean off Isoproterenol and probably discharge in am off atenolol and with a Biotel monitor. He will need a sleep study. Counseled on dietary sodium and caloric restriction. Unlikely to need a pacemaker

## 2021-10-22 NOTE — H&P ADULT - HISTORY OF PRESENT ILLNESS
Mr. Paiz is a 46 year old male with a significant PMH HTN (on daily enalapril and PRN atenolol unsure of doses) coming in with dizziness/light headed sensation. He states that earlier today he felt lightheaded and believed it was because his blood pressure was elevated. As a result he took his enalapril and reportedly was still symptomatic it was because his BP was elevated so he took his normal dose of hispRN atenolol his doctor advised for him to, denies taking more than his recommended dose. Denies taking any other cardiogenic medications. After taking his atenolol patient felt worse and decided to get evaluated. Denies cp, sob, palpitations, abd pains, n/V/D/C, urinary complaints, uri symptoms, cough, ha, numbness, tingling, weakness, slurred speech.      Patient initially presented to [facility]. His vitals on presentation at [place] were T=97.9, HR-45, BP-80/50, RR-18, SpO2-98%. Patient was evaluated for symptomatic bradycardia, labs were significant for a leukocytosis, mild anemia, elevated SCr to 2.66, elevated AST 91 and ALT-161, normal HStroponin, elevated lactate 2.2, normal TSH, and elevated serum pro-BNP of ~1531. Cardiology consulted at [facility] initially believed patient presenting in the setting of beta blocker toxicity started the patient on glucagon without improvement in patient's bradycardia, given that patient remaind persistently hypotensive decision was made to start the patient on dopamine and transfer to [facility] for further care of symptomatic bradycardia. For the beta blocker the patient was given 0.5 atropine, 5mg glucagon, 2mg calcium gluconate all around 12:10am then another 1mg atropine at about 12:40am despite therapy remained refractory bradycardic and was started on dopamine and transferred to [facility] at 12/mcg/kg/min.

## 2021-10-22 NOTE — H&P ADULT - ATTENDING COMMENTS
Young man with symptomatic bradycardia in the setting of beta-blocker use.  Possible beta-blocker toxicity.   Responding to dopamine and Isuprel.  Hypotension requiring monitoring with arterial line.  EP consult. Consider alternative etiologies to beta-blocker, e.g. Lyme.

## 2021-10-22 NOTE — H&P ADULT - NSHPREVIEWOFSYSTEMS_GEN_ALL_CORE
Constitutional/General- +distress.   Eyes- No changes in vision, double vision.   ENT- No nasal congestion or rhinorrhea.   Skin-Denies rashes.  Cardiovascular- Denies chest pain or heart palpitations.   Pulmonary- + shortness of breath.   Gastrointestinal- Denies nausea, vomiting, diarrhea, bloating, constipation, abdominal pain.  Genitourinary-Denies dysuria, frequency, or change in urine odor/appearance.   Musculoskeletal-Denies changes in strength, no joint tenderness or swelling.  Neurologic-Denies changes in memory.+ headache.   Endocrine-Denies heat/cold intolerance.   Psychology-Denies changes in mood. Denies anxiety or depression.  Heme/Lymph-Denies easy bruising.

## 2021-10-22 NOTE — ED ADULT NURSE NOTE - OBJECTIVE STATEMENT
pt stated he took his blood pressure medicine at 7pm last night but did not take his blood pressure before taking medication, stated he does not take the medication everyday compliant of feeling weakness and dizziness pt stated he took his blood pressure medicine at 7pm last night but did not take his blood pressure before taking medication, stated he does not take the medication everyday compliant of feeling weakness and dizziness before he took his medication.

## 2021-10-22 NOTE — PROGRESS NOTE ADULT - SUBJECTIVE AND OBJECTIVE BOX
====================  CCU MIDNIGHT ROUNDS  ====================    MALCOLM PAIZ  [id number]  Patient is a 46y old  Male who presents with a chief complaint of     ====================  SUMMARY: Mr. Paiz is a 46 year old male with a significant PMH HTN (on daily enalapril and PRN atenolol unsure of doses) coming in with dizziness/light headed sensation. He states that earlier today he felt lightheaded and believed it was because his blood pressure was elevated. As a result he took his enalapril and reportedly was still symptomatic it was because his BP was elevated so he took his normal dose of hispRN atenolol his doctor advised for him to, denies taking more than his recommended dose. Denies taking any other cardiogenic medications. After taking his atenolol patient felt worse and decided to get evaluated. Denies cp, sob, palpitations, abd pains, n/V/D/C, urinary complaints, uri symptoms, cough, ha, numbness, tingling, weakness, slurred speech.        ====================  VITALS (Last 12 hrs):  ====================    T(C): 36.8 (10-22-21 @ 19:00), Max: 36.8 (10-22-21 @ 19:00)  T(F): 98.3 (10-22-21 @ 19:00), Max: 98.3 (10-22-21 @ 19:00)  HR: 78 (10-22-21 @ 19:00) (53 - 87)  BP: --  BP(mean): --  ABP: 107/56 (10-22-21 @ 19:00) (86/38 - 132/63)  ABP(mean): 70 (10-22-21 @ 19:00) (52 - 82)  RR: 17 (10-22-21 @ 19:00) (17 - 45)  SpO2: 92% (10-22-21 @ 19:00) (88% - 97%)  Wt(kg): --  CVP(mm Hg): --  CVP(cm H2O): --  CO: --  CI: --  PA: --  PA(mean): --  PCWP: --  SVR: --  PVR: --    I&O's Summary    21 Oct 2021 07:01  -  22 Oct 2021 07:00  --------------------------------------------------------  IN: 106 mL / OUT: 0 mL / NET: 106 mL    22 Oct 2021 07:01  -  22 Oct 2021 19:49  --------------------------------------------------------  IN: 671 mL / OUT: 1900 mL / NET: -1229 mL            ====================  NEW LABS:  ====================                          13.0   18.23 )-----------( 297      ( 22 Oct 2021 06:18 )             39.4     10-22    136  |  106  |  28<H>  ----------------------------<  169<H>  4.6   |  17<L>  |  2.14<H>    Ca    8.7      22 Oct 2021 06:18  Phos  4.5     10-22  Mg     2.1     10-22    TPro  6.6  /  Alb  3.6  /  TBili  0.5  /  DBili  x   /  AST  72<H>  /  ALT  149<H>  /  AlkPhos  109  10    PT/INR - ( 22 Oct 2021 06:18 )   PT: 12.5 sec;   INR: 1.04 ratio         PTT - ( 22 Oct 2021 06:18 )  PTT:27.2 sec  Creatine Kinase, Serum: 132 U/L (10-22-21 @ 09:34)    CKMB Units: 2.9 ng/mL (10-22 @ 09:34)    ABG - ( 22 Oct 2021 09:28 )  pH, Arterial: 7.40  pH, Blood: x     /  pCO2: 33    /  pO2: 119   / HCO3: 20    / Base Excess: -3.6  /  SaO2: 99.2                  ====================  PLAN:  Neuro:    -Mentating appropriately, not encephalopathic, not on any sedation/sedatives.   -CHARLI.     CV:    #Symptomatic Regular Wide Complex Bradycardia:  -Per EKG patient's bradycardia appears to be regular wide complex bradycardia without evidence of P waves on EKG. Bradycardia initially tx with glucagon for concern for beta blocker overdose which was unsuccessful, given atropine without any response, given calcium without any response. Therefore patient's bradycardia most likely not vagally mediated or not calcium responsive etiology including (hyperkalemia, hypocalcemia, hypermagnesemia, CCB induced). Could have idioventricular rhythm from EKG from failure of the SA and AV node.   -Etiology of idioventricular rhythm appears to either from electrolytes (repleted), ischemia (negative trop),   -Patient symptomatic and given elevated lactate, hypotension appears to be in cardiogenic shock from bradycardia, started on dopamine which improved patient's hypotension however HR remains bradycardic.   -TSH wnl, if evidence of heart block AV node block would get lyme titers, ACE level, possibly amyloid eval.   -Obtain echo to eval for possible underlying cardiomyopathy  -Would repeat troponin and pro-BNP   -If persistently bradycardic may need transcutaneous pacing vs TVP?  -Hold atenolol i/s/o bradycardia.     #Cardiogenic Shock:  -Secondary to bradycardia and decreased cardiac output.   -Monitor perfusion markers including lactate, AST/ALT  -C/w dopamine at moderate dose for inotropic support consider switching to isoproterenol,  poor choice given hypotension, epi can be considered as well as BP appears to be more chronotropically/HR mediated rather than pump related.    -May have pulmonary edema from shock state and 2L fluids administered in the ED,  consider diuresis with furosemide 80 mg IVP.     -?swan monitoring of CVP/CI?   -Hold enalapril i/s/o hypotension and elevated creatinine.     Pulm:  -On 3L RA, no documented signs of hypoxia on flowsheets. CXR with signs of bibasilar reticular opacities possibly atelectasis vs. pulmonary edema from shock state.   -Upon presentation patient found to be hypoxic to 82% on 3L, NC uptitrated to 15L with improvement in hypoxia to 90% now transitioned to HFNC 40/100 for PEEP along with oxygenation support.     GI:  #Transaminitis:  -Marker of perfusion, most likely i/s/o poor forward perfusion from cardiogenic shock vs. onset of congestive hepatopathy from cardiogenic shock state. CTM and will treat shock state to optimize.     /Renal:    #Elevated SCr:  -Unsure what patient's baseline is, if CHRISTIANO most likely hemodynamically mediated ATN vs. pre-renal azotemia from possible low ECV from bradycardia and hypotension, optimizing shock state may help mediate renal recovery.   -Monitor I's and O's. Bladder scan with PVR ~50cc.   -Consider escobar placement for UOP.     Endo/Rheum:    -CHARLI    Heme/onc:    #Anemia:  -MIldly anemic can consider iron studies when more stable to eval.     #Leukocytosis:  -Reactive most likely  -CTM    ID:  -CHARLI    DVT ppx  -Would hold i/s/o possible intervention for bradycardia.           Zaid Verdin PA-C CICU

## 2021-10-22 NOTE — ED ADULT NURSE REASSESSMENT NOTE - NS ED NURSE REASSESS COMMENT FT1
Pt sleeping comfortably no further episodes of vomiting noted, cardiac monitoring and dopamine drip via pump continues. pt for transfer to [facility] awaiting transportation, ed observation continues.

## 2021-10-22 NOTE — CONSULT NOTE ADULT - SUBJECTIVE AND OBJECTIVE BOX
Patient seen and evaluated at bedside    HPI:  Patient is a 46 year old male with a significant PMH HTN (on enalapril and PRN atenolol unsure of doses) coming in with dizziness/light headed sensation. He states that earlier today (1PM) he felt lightheaded and believed it was because his blood pressure was elevated. As a result he took his enalapril and reportedly was still symptomatic it was because his BP was elevated so he took his normal dose of his PRN atenolol (25mg). He denies taking more than his recommended dose. Denies taking any other cardiac medications. After taking his atenolol patient felt worse and decided to get evaluated. Denies cp, sob, palpitations, abd pains, n/V/D/C, urinary complaints, uri symptoms, cough, ha, numbness, tingling, weakness, slurred speech.    Patient initially presented to [facility]. His vitals on presentation at [facility] were T=97.9, HR-45, BP-80/50, RR-18, SpO2-98%. Patient was evaluated for symptomatic bradycardia, labs were significant for a leukocytosis, mild anemia, elevated SCr to 2.66, elevated AST 91 and ALT-161, normal HStroponin, elevated lactate 2.2, normal TSH, and elevated serum pro-BNP of ~1531. Cardiology consulted at [facility] initially believed patient presenting in the setting of beta blocker toxicity started the patient on glucagon without improvement in patient's bradycardia, given that patient remained persistently hypotensive decision was made to start the patient on dopamine and transfer to [facility] for further care of symptomatic bradycardia. For the beta blocker the patient was given 0.5 atropine, 5mg glucagon, 2mg calcium gluconate all around 12:10am then another 1mg atropine at about 12:40am despite therapy remained refractory bradycardic and was started on dopamine and transferred to [facility] at 12/mcg/kg/min.     PMHx: HTN diagnosed in mid 30's; started atenolol 6yrs ago    PSHx:     FAMILY HISTORY:  Father MI: 50's  Brother "chest pain, on medication" in 40's    Allergies:  No Known Allergies    Home Medications:    Current Medications:   chlorhexidine 2% Cloths 1 Application(s) Topical <User Schedule>  DOPamine Infusion 12 MICROgram(s)/kG/Min IV Continuous <Continuous>  heparin   Injectable 5000 Unit(s) SubCutaneous every 8 hours  influenza   Vaccine 0.5 milliLiter(s) IntraMuscular once  isoproterenol Infusion 5 MICROgram(s)/Min IV Continuous <Continuous>    Social History  Smoking History: former (years ago)  Alcohol Use: former (EtOH 3-4 months ago)  Drug Use: former (cocaine 1month ago)    REVIEW OF SYSTEMS:  Constitutional:     [X] negative [ ] fevers [ ] chills [ ] weight loss [ ] weight gain  HEENT:                  [X] negative [ ] dry eyes [ ] eye irritation [ ] postnasal drip [ ] nasal congestion  CV:                         [X] negative  [ ] chest pain [ ] orthopnea [ ] palpitations [ ] murmur  Resp:                     [X] negative [ ] cough [ ] shortness of breath [ ] wheezing [ ] sputum [ ] hemoptysis  GI:                          [X] negative [ ] nausea [ ] vomiting [ ] diarrhea [ ] constipation [ ] abd pain [ ] dysphagia   :                        [X] negative [ ] dysuria [ ] nocturia [ ] hematuria [ ] increased urinary frequency  MSK:                      [X] negative [ ] back pain [ ] myalgias [ ] arthralgias [ ] fracture  Skin:                       [X] negative [ ] rash [ ] itch  Neuro:                   [X] negative [ ] headache [ ] dizziness [ ] syncope [ ] weakness [ ] numbness  Psych:                    [X] negative [ ] anxiety [ ] depression  Endo:                     [X] negative [ ] diabetes [ ] thyroid problem  Heme/Lymph:      [X] negative [ ] anemia [ ] bleeding problem  Allergic/Immune: [X] negative [ ] itchy eyes [ ] nasal discharge [ ] hives [ ] angioedema    [X] All other systems negative or otherwise described above.  [ ] Unable to assess ROS because ________.    ICU Vital Signs Last 24 Hrs  T(C): 36.7 (22 Oct 2021 12:00), Max: 36.7 (22 Oct 2021 08:00)  T(F): 98 (22 Oct 2021 12:00), Max: 98 (22 Oct 2021 08:00)  HR: 59 (22 Oct 2021 14:15) (45 - 67)  BP: 91/55 (22 Oct 2021 05:26) (80/50 - 110/58)  BP(mean): 69 (22 Oct 2021 05:26) (69 - 69)  ABP: 88/42 (22 Oct 2021 14:15) (0/19 - 115/47)  ABP(mean): 55 (22 Oct 2021 14:15) (52 - 94)  RR: 21 (22 Oct 2021 14:15) (16 - 45)  SpO2: 95% (22 Oct 2021 14:15) (85% - 100%)    Orthostatic VS    Daily Height in cm: 172.72 (22 Oct 2021 05:26)    Daily   I&O's Summary    21 Oct 2021 07:01  -  22 Oct 2021 07:00  --------------------------------------------------------  IN: 106 mL / OUT: 0 mL / NET: 106 mL    22 Oct 2021 07:01  -  22 Oct 2021 14:32  --------------------------------------------------------  IN: 213 mL / OUT: 600 mL / NET: -387 mL        Physical Exam:  GENERAL: No acute distress, obese  HEAD:  Atraumatic, Normocephalic  ENT: EOMI, conjunctiva and sclera clear, Neck supple, No JVD, moist mucosa  CHEST/LUNG: Clear to auscultation bilaterally; No wheeze, equal breath sounds bilaterally   BACK: No spinal tenderness  HEART: Regular rate and rhythm; No murmurs, rubs, or gallops, radial and DP 2+ b/l, hypervolemic  ABDOMEN: Soft, Nontender, Nondistended  EXTREMITIES:  No clubbing, cyanosis; 1+ pitting edema to the lower shins  PSYCH: Nl behavior, nl affect  NEUROLOGY: AAOx3, non-focal  SKIN: Normal color, No rashes or lesions    LABS:                        13.0   18.23 )-----------( 297      ( 22 Oct 2021 06:18 )             39.4     PT/INR - ( 22 Oct 2021 06:18 )   PT: 12.5 sec;   INR: 1.04 ratio         PTT - ( 22 Oct 2021 06:18 )  PTT:27.2 sec  10-22    136  |  106  |  28<H>  ----------------------------<  169<H>  4.6   |  17<L>  |  2.14<H>    Ca    8.7      22 Oct 2021 06:18  Phos  4.5     10-22  Mg     2.1     10-22    TPro  6.6  /  Alb  3.6  /  TBili  0.5  /  DBili  x   /  AST  72<H>  /  ALT  149<H>  /  AlkPhos  109  10-22    CARDIAC MARKERS ( 22 Oct 2021 09:34 )  x     / x     / 132 U/L / x     / 2.9 ng/mL  proBNP: Serum Pro-Brain Natriuretic Peptide: 1531 pg/mL (10-22-21 @ 00:46)  Lipid Profile: Cholesterol 146 mg/dL, Triglyceride 108 mg/dL, LDL 86 mg/dL, HDL 38 mg/dL, [10-22-21]  HgA1c: A1C with Estimated Average Glucose (10.22.21 @ 08:57)    A1C with Estimated Average Glucose Result: 6.5%    Estimated Average Glucose: 140 mg/dL  TSH: Thyroid Stimulating Hormone, Serum: 0.94 uIU/mL (10-22-21 @ 12:11)        RADIOLOGY & ADDITIONAL STUDIES:    Cardiovascular Diagnostic Testing    ECG: Personally reviewed; sinus arrest with wide complex bradycardia and retrograde P's    Telemetry: reviewed; sinus arrest, wide complex bradycardia, occasional trigeminy    Echo: Personally reviewed  < from: TTE with Doppler (w/Cont) (10.22.21 @ 10:32) >  ------------------------------------------------------------------------  Dimensions:    Normal Values:  LA:     3.8    2.0 - 4.0 cm  Ao:     3.4    2.0 - 3.8 cm  SEPTUM: 1.4    0.6 - 1.2 cm  PWT:    1.2    0.6 - 1.1 cm  LVIDd:  4.9    3.0 - 5.6 cm  LVIDs:  3.4    1.8 - 4.0 cm  Derived variables:  LVMI: 110 g/m2  RWT: 0.48  Fractional short: 31 %  EF (Deluca Rule): 78 %Doppler Peak Velocity (m/sec):  AoV=1.5  ------------------------------------------------------------------------  Observations:  Mitral Valve: Normal mitral valve. Minimal mitral  regurgitation.  Aortic Valve/Aorta: Calcified trileaflet aortic valve with  normal opening. Peak transaortic valve gradient equals 9 mm  Hg, mean transaortic valve gradient equals 4 mm Hg, aortic  valve velocity time integral equals 26 cm, estimated aortic  valve area equals 2.3 sqcm. No aortic valve regurgitation  seen. Peak left ventricular outflow tract gradient equals 6  mm Hg, mean gradient is equal to 2 mm Hg, LVOT velocity  time integral equals 21 cm.  Aortic Root: 3.4 cm.  LVOT diameter: 1.9 cm.  Left Atrium: Normal left atrium.  LA volume index = 29  cc/m2.  Left Ventricle: Endocardial visualization enhanced with  intravenous injection of Ultrasonic Enhancing Agent  (Definity). Hyperdynamic left ventricular systolic  function. No left ventricular thrombus. Mild concentric  left ventricular hypertrophy. Unable to evaluate diastolic  function  Right Heart: Normal right atrium. Normal right ventricular  size and function. Tricuspid valve not well visualized,  probably normal. Minimal tricuspid regurgitation. Normal  pulmonic valve. No pulmonic regurgitation.  Pericardium/Pleura: Normal pericardium with no pericardial  effusion.  Hemodynamic: Estimated right atrial pressure is 8 mm Hg.  Color Doppler demonstrates no evidence of a patent foramen  ovale.  ------------------------------------------------------------------------  Conclusions:  1. Normal mitral valve. Minimal mitral regurgitation.  2. Calcified trileaflet aortic valve with normal opening.  No aortic valve regurgitation seen.  3. Mild concentric left ventricular hypertrophy.  4. Endocardial visualization enhanced with intravenous  injection of Ultrasonic Enhancing Agent (Definity).  Hyperdynamic left ventricular systolic function. No left  ventricular thrombus.  5. Normal right ventricular size and function.  *** No previous Echo exam.  ------------------------------------------------------------------------  Confirmed on  10/22/2021 - 12:48:14 by Jeremiah Mayers M.D.  ------------------------------------------------------------------------    < end of copied text >    Stress Testing: none    Cath: none    CXR: Personally reviewed  < from: Xray Chest 1 View- PORTABLE-Urgent (Xray Chest 1 View- PORTABLE-Urgent .) (10.22.21 @ 06:05) >  IMPRESSION:  Clearlungs.    --- End of Report ---    < end of copied text >      Other cardiac imaging: none    Other misc imaging: none

## 2021-10-22 NOTE — ED ADULT NURSE NOTE - NSIMPLEMENTINTERV_GEN_ALL_ED
Implemented All Universal Safety Interventions:  [place] to call system. Call bell, personal items and telephone within reach. Instruct patient to call for assistance. Room bathroom lighting operational. Non-slip footwear when patient is off stretcher. Physically safe environment: no spills, clutter or unnecessary equipment. Stretcher in lowest position, wheels locked, appropriate side rails in place.

## 2021-10-23 ENCOUNTER — TRANSCRIPTION ENCOUNTER (OUTPATIENT)
Age: 46
End: 2021-10-23

## 2021-10-23 VITALS
DIASTOLIC BLOOD PRESSURE: 76 MMHG | HEART RATE: 82 BPM | RESPIRATION RATE: 21 BRPM | OXYGEN SATURATION: 97 % | SYSTOLIC BLOOD PRESSURE: 131 MMHG

## 2021-10-23 LAB
ALBUMIN SERPL ELPH-MCNC: 3.4 G/DL — SIGNIFICANT CHANGE UP (ref 3.3–5)
ALP SERPL-CCNC: 93 U/L — SIGNIFICANT CHANGE UP (ref 40–120)
ALT FLD-CCNC: 105 U/L — HIGH (ref 10–45)
ANION GAP SERPL CALC-SCNC: 13 MMOL/L — SIGNIFICANT CHANGE UP (ref 5–17)
APTT BLD: 28 SEC — SIGNIFICANT CHANGE UP (ref 27.5–35.5)
AST SERPL-CCNC: 29 U/L — SIGNIFICANT CHANGE UP (ref 10–40)
B BURGDOR C6 AB SER-ACNC: NEGATIVE — SIGNIFICANT CHANGE UP
B BURGDOR IGG+IGM SER-ACNC: 0.08 INDEX — SIGNIFICANT CHANGE UP (ref 0.01–0.89)
BILIRUB SERPL-MCNC: 0.6 MG/DL — SIGNIFICANT CHANGE UP (ref 0.2–1.2)
BUN SERPL-MCNC: 30 MG/DL — HIGH (ref 7–23)
CALCIUM SERPL-MCNC: 8.2 MG/DL — LOW (ref 8.4–10.5)
CHLORIDE SERPL-SCNC: 107 MMOL/L — SIGNIFICANT CHANGE UP (ref 96–108)
CO2 SERPL-SCNC: 21 MMOL/L — LOW (ref 22–31)
CREAT SERPL-MCNC: 1.31 MG/DL — HIGH (ref 0.5–1.3)
GLUCOSE BLDC GLUCOMTR-MCNC: 103 MG/DL — HIGH (ref 70–99)
GLUCOSE BLDC GLUCOMTR-MCNC: 106 MG/DL — HIGH (ref 70–99)
GLUCOSE BLDC GLUCOMTR-MCNC: 94 MG/DL — SIGNIFICANT CHANGE UP (ref 70–99)
GLUCOSE SERPL-MCNC: 101 MG/DL — HIGH (ref 70–99)
HCT VFR BLD CALC: 36.7 % — LOW (ref 39–50)
HGB BLD-MCNC: 12.3 G/DL — LOW (ref 13–17)
INR BLD: 1.17 RATIO — HIGH (ref 0.88–1.16)
MAGNESIUM SERPL-MCNC: 2 MG/DL — SIGNIFICANT CHANGE UP (ref 1.6–2.6)
MCHC RBC-ENTMCNC: 27.9 PG — SIGNIFICANT CHANGE UP (ref 27–34)
MCHC RBC-ENTMCNC: 33.5 GM/DL — SIGNIFICANT CHANGE UP (ref 32–36)
MCV RBC AUTO: 83.2 FL — SIGNIFICANT CHANGE UP (ref 80–100)
NRBC # BLD: 0 /100 WBCS — SIGNIFICANT CHANGE UP (ref 0–0)
PHOSPHATE SERPL-MCNC: 4.3 MG/DL — SIGNIFICANT CHANGE UP (ref 2.5–4.5)
PLATELET # BLD AUTO: 255 K/UL — SIGNIFICANT CHANGE UP (ref 150–400)
POTASSIUM SERPL-MCNC: 4 MMOL/L — SIGNIFICANT CHANGE UP (ref 3.5–5.3)
POTASSIUM SERPL-SCNC: 4 MMOL/L — SIGNIFICANT CHANGE UP (ref 3.5–5.3)
PROT SERPL-MCNC: 6.5 G/DL — SIGNIFICANT CHANGE UP (ref 6–8.3)
PROTHROM AB SERPL-ACNC: 13.9 SEC — HIGH (ref 10.6–13.6)
RBC # BLD: 4.41 M/UL — SIGNIFICANT CHANGE UP (ref 4.2–5.8)
RBC # FLD: 14.9 % — HIGH (ref 10.3–14.5)
SODIUM SERPL-SCNC: 141 MMOL/L — SIGNIFICANT CHANGE UP (ref 135–145)
WBC # BLD: 10.91 K/UL — HIGH (ref 3.8–10.5)
WBC # FLD AUTO: 10.91 K/UL — HIGH (ref 3.8–10.5)

## 2021-10-23 PROCEDURE — 82330 ASSAY OF CALCIUM: CPT

## 2021-10-23 PROCEDURE — 83036 HEMOGLOBIN GLYCOSYLATED A1C: CPT

## 2021-10-23 PROCEDURE — 84443 ASSAY THYROID STIM HORMONE: CPT

## 2021-10-23 PROCEDURE — 85014 HEMATOCRIT: CPT

## 2021-10-23 PROCEDURE — 86900 BLOOD TYPING SEROLOGIC ABO: CPT

## 2021-10-23 PROCEDURE — 85610 PROTHROMBIN TIME: CPT

## 2021-10-23 PROCEDURE — 83605 ASSAY OF LACTIC ACID: CPT

## 2021-10-23 PROCEDURE — 82947 ASSAY GLUCOSE BLOOD QUANT: CPT

## 2021-10-23 PROCEDURE — 82435 ASSAY OF BLOOD CHLORIDE: CPT

## 2021-10-23 PROCEDURE — 86850 RBC ANTIBODY SCREEN: CPT

## 2021-10-23 PROCEDURE — 86901 BLOOD TYPING SEROLOGIC RH(D): CPT

## 2021-10-23 PROCEDURE — 83735 ASSAY OF MAGNESIUM: CPT

## 2021-10-23 PROCEDURE — 80061 LIPID PANEL: CPT

## 2021-10-23 PROCEDURE — 90686 IIV4 VACC NO PRSV 0.5 ML IM: CPT

## 2021-10-23 PROCEDURE — 85027 COMPLETE CBC AUTOMATED: CPT

## 2021-10-23 PROCEDURE — 84132 ASSAY OF SERUM POTASSIUM: CPT

## 2021-10-23 PROCEDURE — 82553 CREATINE MB FRACTION: CPT

## 2021-10-23 PROCEDURE — 82803 BLOOD GASES ANY COMBINATION: CPT

## 2021-10-23 PROCEDURE — 80053 COMPREHEN METABOLIC PANEL: CPT

## 2021-10-23 PROCEDURE — 85730 THROMBOPLASTIN TIME PARTIAL: CPT

## 2021-10-23 PROCEDURE — 82565 ASSAY OF CREATININE: CPT

## 2021-10-23 PROCEDURE — 84295 ASSAY OF SERUM SODIUM: CPT

## 2021-10-23 PROCEDURE — 82962 GLUCOSE BLOOD TEST: CPT

## 2021-10-23 PROCEDURE — 82550 ASSAY OF CK (CPK): CPT

## 2021-10-23 PROCEDURE — 84484 ASSAY OF TROPONIN QUANT: CPT

## 2021-10-23 PROCEDURE — 85018 HEMOGLOBIN: CPT

## 2021-10-23 PROCEDURE — 71045 X-RAY EXAM CHEST 1 VIEW: CPT

## 2021-10-23 PROCEDURE — 99239 HOSP IP/OBS DSCHRG MGMT >30: CPT

## 2021-10-23 PROCEDURE — 86769 SARS-COV-2 COVID-19 ANTIBODY: CPT

## 2021-10-23 PROCEDURE — C8929: CPT

## 2021-10-23 PROCEDURE — 93005 ELECTROCARDIOGRAM TRACING: CPT

## 2021-10-23 PROCEDURE — 86618 LYME DISEASE ANTIBODY: CPT

## 2021-10-23 PROCEDURE — 84100 ASSAY OF PHOSPHORUS: CPT

## 2021-10-23 RX ORDER — ATENOLOL 25 MG/1
1 TABLET ORAL
Qty: 0 | Refills: 0 | DISCHARGE

## 2021-10-23 RX ORDER — TRAMADOL HYDROCHLORIDE 50 MG/1
1 TABLET ORAL
Qty: 0 | Refills: 0 | DISCHARGE

## 2021-10-23 RX ORDER — AMLODIPINE BESYLATE AND BENAZEPRIL HYDROCHLORIDE 10; 20 MG/1; MG/1
1 CAPSULE ORAL
Qty: 0 | Refills: 0 | DISCHARGE

## 2021-10-23 RX ORDER — ISOPROPYL ALCOHOL, BENZOCAINE .7; .06 ML/ML; ML/ML
1 SWAB TOPICAL
Qty: 100 | Refills: 1
Start: 2021-10-23 | End: 2021-12-11

## 2021-10-23 RX ADMIN — CHLORHEXIDINE GLUCONATE 1 APPLICATION(S): 213 SOLUTION TOPICAL at 05:18

## 2021-10-23 RX ADMIN — HEPARIN SODIUM 5000 UNIT(S): 5000 INJECTION INTRAVENOUS; SUBCUTANEOUS at 13:42

## 2021-10-23 RX ADMIN — HEPARIN SODIUM 5000 UNIT(S): 5000 INJECTION INTRAVENOUS; SUBCUTANEOUS at 05:18

## 2021-10-23 RX ADMIN — INFLUENZA VIRUS VACCINE 0.5 MILLILITER(S): 15; 15; 15; 15 SUSPENSION INTRAMUSCULAR at 09:35

## 2021-10-23 NOTE — DISCHARGE NOTE PROVIDER - NSDCMRMEDTOKEN_GEN_ALL_CORE_FT
alcohol swabs : Apply topically to affected area 4 times a day   amlodipine-benazepril 10 mg-40 mg oral capsule: 1 cap(s) orally once a day  glucometer (per patient&#x27;s insurance): Test blood sugars four times a day. Dispense #1 glucometer.  lancets: 1 application subcutaneously 4 times a day   pravastatin 20 mg oral tablet: 1 tab(s) orally once a day  test strips (per patient&#x27;s insurance): 1 application subcutaneously 4 times a day. ** Compatible with patient&#x27;s glucometer **  traMADol 50 mg oral tablet: 1 tab(s) orally 2 times a day, As Needed   alcohol swabs : Apply topically to affected area 4 times a day   glucometer (per patient&#x27;s insurance): Test blood sugars four times a day. Dispense #1 glucometer.  lancets: 1 application subcutaneously 4 times a day   pravastatin 20 mg oral tablet: 1 tab(s) orally once a day  test strips (per patient&#x27;s insurance): 1 application subcutaneously 4 times a day. ** Compatible with patient&#x27;s glucometer **  traMADol 50 mg oral tablet: 1 tab(s) orally 2 times a day, As Needed

## 2021-10-23 NOTE — PROGRESS NOTE ADULT - ASSESSMENT
47yo M w/ PMH HTN (on enalapril qd, atenolol prn) presenting with dizziness/lightheadedness found to be in junctional bradycardia     Neuro:  -Mentating appropriately, not encephalopathic, not on any sedation/sedatives.   -CHARLI.     CV:    #Symptomatic Regular Wide Complex Bradycardia:  -Per EKG patient's bradycardia appears to be regular wide complex bradycardia without evidence of P waves on EKG. Bradycardia initially tx with glucagon for concern for beta blocker overdose which was unsuccessful, given atropine without any response, given calcium without any response. Therefore patient's bradycardia most likely not vagally mediated or not calcium responsive etiology including (hyperkalemia, hypocalcemia, hypermagnesemia, CCB induced). Could have idioventricular rhythm from EKG from failure of the SA and AV node.   -Etiology of idioventricular rhythm appears to either from electrolytes (repleted), ischemia (negative trop),   -Patient symptomatic and given elevated lactate, hypotension appears to be in cardiogenic shock from bradycardia, started on dopamine which improved patient's hypotension however HR remains bradycardic.   -TSH wnl, if evidence of heart block AV node block would get lyme titers, ACE level, possibly amyloid eval.   -Obtain echo to eval for possible underlying cardiomyopathy  -Would repeat troponin and pro-BNP   -If persistently bradycardic may need transcutaneous pacing vs TVP?  -Hold atenolol i/s/o bradycardia.   - plan for d/c w/ Biotel and EP follow up       #Cardiogenic Shock:  -Secondary to bradycardia and decreased cardiac output.   -Monitor perfusion markers including lactate, AST/ALT  - now off dopamine   - hold home enalapril i/s/o hypotension and elevated creatinine    Pulm:  -On 3L RA, no documented signs of hypoxia on flowsheets. CXR with signs of bibasilar reticular opacities possibly atelectasis vs. pulmonary edema from shock state.   -Upon presentation patient found to be hypoxic to 82% on 3L, NC uptitrated to 15L with improvement in hypoxia to 90% now transitioned to HFNC 40/100 for PEEP along with oxygenation support.     GI:  #Transaminitis:  -Marker of perfusion, most likely i/s/o poor forward perfusion from cardiogenic shock vs. onset of congestive hepatopathy from cardiogenic shock state. CTM and will treat shock state to optimize.     /Renal:    #Elevated SCr:  -Unsure what patient's baseline is, if CHRISTIANO most likely hemodynamically mediated ATN vs. pre-renal azotemia from possible low ECV from bradycardia and hypotension, optimizing shock state may help mediate renal recovery.   - CHRISTIANO resolving  - Cr downtrendin.66 > 2.14 > 1.31     Endo/Rheum:  new onset DM, likely type 2.   - hgb a1c 6.5   - low-dose ISS  - d/c with rx for glucometer   - will need outpt f/u    Heme/onc:  Anemia:  -MIldly anemic can consider iron studies when more stable to eval.       ID:  - initial leukocytosis likely reactive, now trending, 10.9 today. afebrile  - cont to monitor off abx    45yo M w/ PMH HTN (on enalapril qd, atenolol prn) presenting with dizziness/lightheadedness found to be in junctional bradycardia. Thought to be 2/2 beta blocker overdose/toxicity but did not respond to tx. Transferred to [facility] CCU on dopamine gtt and briefly isoproterenol gtt.     Neuro:  -Mentating appropriately, not encephalopathic, not on any sedation/sedatives.   -CHARLI.     CV:    #Symptomatic Regular Wide Complex Bradycardia:  -Per EKG patient's bradycardia appears to be regular wide complex bradycardia without evidence of P waves on EKG. Bradycardia initially tx with glucagon for concern for beta blocker overdose which was unsuccessful, given atropine without any response, given calcium without any response. Therefore patient's bradycardia most likely not vagally mediated or not calcium responsive etiology including (hyperkalemia, hypocalcemia, hypermagnesemia, CCB induced). Could have idioventricular rhythm from EKG from failure of the SA and AV node.   -Etiology of idioventricular rhythm appears to either from electrolytes (repleted), ischemia (negative trop),   -Patient symptomatic and given elevated lactate, hypotension appears to be in cardiogenic shock from bradycardia, started on dopamine which improved patient's hypotension however HR remains bradycardic.   -TSH wnl, if evidence of heart block AV node block would get lyme titers, ACE level, possibly amyloid eval.   -Obtain echo to eval for possible underlying cardiomyopathy  -Would repeat troponin and pro-BNP   -If persistently bradycardic may need transcutaneous pacing vs TVP?  -Hold atenolol i/s/o bradycardia.   - plan for d/c w/ Biotel and EP follow up       #Cardiogenic Shock:  -Secondary to bradycardia and decreased cardiac output.   -Monitor perfusion markers including lactate, AST/ALT  - now off dopamine   - hold home enalapril i/s/o hypotension and elevated creatinine    Pulm:  -On 3L RA, no documented signs of hypoxia on flowsheets. CXR with signs of bibasilar reticular opacities possibly atelectasis vs. pulmonary edema from shock state.   -Upon presentation patient found to be hypoxic to 82% on 3L, NC uptitrated to 15L with improvement in hypoxia to 90% now transitioned to HFNC 40/100 for PEEP along with oxygenation support.     GI:  #Transaminitis:  -Marker of perfusion, most likely i/s/o poor forward perfusion from cardiogenic shock vs. onset of congestive hepatopathy from cardiogenic shock state. CTM and will treat shock state to optimize.     /Renal:    #Elevated SCr:  -Unsure what patient's baseline is, if CHRISTIANO most likely hemodynamically mediated ATN vs. pre-renal azotemia from possible low ECV from bradycardia and hypotension, optimizing shock state may help mediate renal recovery.   - CHRISTIANO resolving  - Cr downtrendin.66 > 2.14 > 1.31     Endo/Rheum:  new onset DM, likely type 2.   - hgb a1c 6.5   - low-dose ISS  - d/c with rx for glucometer   - will need outpt f/u    Heme/onc:  Anemia:  -MIldly anemic can consider iron studies when more stable to eval.       ID:  - initial leukocytosis likely reactive, now trending, 10.9 today. afebrile  - cont to monitor off abx

## 2021-10-23 NOTE — DISCHARGE NOTE PROVIDER - NSDCCPCAREPLAN_GEN_ALL_CORE_FT
PRINCIPAL DISCHARGE DIAGNOSIS  Diagnosis: Junctional bradycardia  Assessment and Plan of Treatment: When you went to [facility] for lightheadedness, it was found that your heart was beating too slowly. This may have been partially caused by the atenolol (a beta blocker) that you took at home but your heart did not respond to the treatment for beta blocker overdose or toxicity. You were transferred here for further evaluation. You were seen by our electrophysiologists, who feel that you are now safe enough to go home with a monitor that will keep track of your heart rhythm for the next 2 weeks. Please continue to wear the monitor and follow up with Dr. Barrientos in 2 weeks. In the mean time, do NOT take any more of your atenolol. If you develop symptoms again, please return to the hospital.      SECONDARY DISCHARGE DIAGNOSES  Diagnosis: Diabetes mellitus, new onset  Assessment and Plan of Treatment: Your hemoglobin a1C here was 6.5, which just met the cutoff for diabetes. But while you were here your blood glucose was well controlled. We are sending you a prescription for a glucometer to your pharmacy- a device to check your blood sugar at home. Please monitor it at least once daily and follow up with your primary care doctor to see whether you should be started on medication for it.     PRINCIPAL DISCHARGE DIAGNOSIS  Diagnosis: Junctional bradycardia  Assessment and Plan of Treatment: When you went to [facility] for lightheadedness, it was found that your heart was beating too slowly. This may have been partially caused by the atenolol (a beta blocker) that you took at home but your heart did not respond to the treatment for beta blocker overdose or toxicity. You were transferred here for further evaluation. You were seen by our electrophysiologists, who feel that you are now safe enough to go home with a monitor that will keep track of your heart rhythm for the next 2 weeks. Please continue to wear the monitor and follow up with Dr. Barrientos in 2 weeks. In the mean time, do NOT take any more of your atenolol or amlodipine until told you can resume. If you develop symptoms again, please return to the hospital.      SECONDARY DISCHARGE DIAGNOSES  Diagnosis: Diabetes mellitus, new onset  Assessment and Plan of Treatment: Your hemoglobin a1C here was 6.5, which just met the cutoff for diabetes. But while you were here your blood glucose was well controlled. We are sending you a prescription for a glucometer to your pharmacy- a device to check your blood sugar at home. Please monitor it at least once daily and follow up with your primary care doctor to see whether you should be started on medication for it.

## 2021-10-23 NOTE — DISCHARGE NOTE NURSING/CASE MANAGEMENT/SOCIAL WORK - PATIENT PORTAL LINK FT
You can access the FollowMyHealth Patient Portal offered by [facility] by registering at the following website: [email or web address]. By joining [facility]’s FollowMyHealth portal, you will also be able to view your health information using other applications (apps) compatible with our system.

## 2021-10-23 NOTE — DISCHARGE NOTE PROVIDER - HOSPITAL COURSE
Mr. Donovan is a 45yo M w/ PMH HTN (on enalapril and PRN atenolol) presented as a transfer from [facility] for symptomatic bradycardia (dizziness/lightheadedness). At [facility], cardiology was consulted and he was treated for beta blocker toxicity with glucagon with no response. He was also given atropine and calcium without any response. He remained bradycardic and was put on dopamine gtt and transferred to [facility] CCU.    Here he was evaluated by EP Mr. Donovan is a 47yo M w/ PMH HTN (on enalapril and PRN atenolol) presented as a transfer from [facility] for symptomatic bradycardia (dizziness/lightheadedness). At [facility], cardiology was consulted and he was treated for beta blocker toxicity with glucagon with no response. He was also given atropine and calcium without any response. He remained bradycardic and was put on dopamine gtt and transferred to [facility] CCU.    Here, the patient returned to [facility] and was hemodynamically stable and weaned off dopamine and Isuprel. His TTE was normal with EF 78%. He was evaluated by EP who believes the episode was likely vagally mediated given his recent GI symptoms, which was then exacerbated by the antihypertensives. He is now stable for discharge, off the atenolol, and with a Biotel monitor for 2 weeks with EP follow up outpatient. Per EP, does not need a pacemaker at this time.

## 2021-10-23 NOTE — DISCHARGE NOTE NURSING/CASE MANAGEMENT/SOCIAL WORK - NSDCVIVACCINE_GEN_ALL_CORE_FT
influenza, injectable, quadrivalent, preservative free; 23-Oct-2021 09:35; Alejandra Palm (EREN); [place]; [id number] (Exp. 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 The surgical margins are positive for tumor cells.

## 2021-10-23 NOTE — PROGRESS NOTE ADULT - ATTENDING COMMENTS
Patient with return to normal sinus rhythm with cessation of beta-blocker.  Ambulating without symptoms.    Discharge planning with extended holter monitor.  EP to follow-up as outpatient.

## 2021-10-23 NOTE — DISCHARGE NOTE PROVIDER - CARE PROVIDER_API CALL
Kathy Barrientos (MD; PhD)  Cardiac Electrophysiology; Cardiovascular Disease; Internal Medicine  [facility] - Dept of Cardiology, [address] [place] [address]  Phone: [phone]  Fax: [phone]  Established Patient  Follow Up Time: 2 weeks

## 2021-10-26 PROBLEM — Z00.00 ENCOUNTER FOR PREVENTIVE HEALTH EXAMINATION: Status: ACTIVE | Noted: 2021-10-26

## 2021-10-27 LAB
CULTURE RESULTS: SIGNIFICANT CHANGE UP
CULTURE RESULTS: SIGNIFICANT CHANGE UP
SPECIMEN SOURCE: SIGNIFICANT CHANGE UP
SPECIMEN SOURCE: SIGNIFICANT CHANGE UP

## 2021-11-10 ENCOUNTER — APPOINTMENT (OUTPATIENT)
Dept: ELECTROPHYSIOLOGY | Facility: CLINIC | Age: 46
End: 2021-11-10

## 2021-11-12 PROCEDURE — 93228 REMOTE 30 DAY ECG REV/REPORT: CPT

## 2023-01-01 NOTE — ED ADULT NURSE NOTE - NS_NURSE_BED_LOCATION_ED_ALL_ED
For information on Fall & Injury Prevention, visit: [email or web address]/[email or web address]-[email or web address] OR  [email or web address]/[email or web address]-tips-[email or web address]-injury OR  https://www.cdc.gov/steadi/patient.html For information on Fall & Injury Prevention, visit: [email or web address]://[email or web address]-[email or web address]-[email or web address]-mobility OR  [email or web address]://[email or web address]/[email or web address]-prevention-tips-to-avoid-injury OR  https://www.cdc.gov/steadi/patient.html Yes (specify)

## 2023-04-13 NOTE — PROCEDURE NOTE - NSICDXPROCEDURE_GEN_ALL_CORE_FT
PROCEDURES:  Measurement of intra-atrial pressure 22-Oct-2021 05:54:53  Rehana Rey  
,DirectAddress_Unknown

## 2023-11-30 ENCOUNTER — INPATIENT (INPATIENT)
Facility: HOSPITAL | Age: 48
LOS: 2 days | Discharge: HOME | DRG: 552 | End: 2023-12-03
Attending: STUDENT IN AN ORGANIZED HEALTH CARE EDUCATION/TRAINING PROGRAM | Admitting: STUDENT IN AN ORGANIZED HEALTH CARE EDUCATION/TRAINING PROGRAM
Payer: MEDICAID

## 2023-11-30 VITALS
HEART RATE: 77 BPM | HEIGHT: 68 IN | DIASTOLIC BLOOD PRESSURE: 65 MMHG | RESPIRATION RATE: 20 BRPM | SYSTOLIC BLOOD PRESSURE: 126 MMHG | WEIGHT: 250 LBS | TEMPERATURE: 99 F | OXYGEN SATURATION: 95 %

## 2023-11-30 PROCEDURE — 99285 EMERGENCY DEPT VISIT HI MDM: CPT

## 2023-12-01 DIAGNOSIS — R00.1 BRADYCARDIA, UNSPECIFIED: ICD-10-CM

## 2023-12-01 DIAGNOSIS — M54.9 DORSALGIA, UNSPECIFIED: ICD-10-CM

## 2023-12-01 DIAGNOSIS — I10 ESSENTIAL (PRIMARY) HYPERTENSION: ICD-10-CM

## 2023-12-01 DIAGNOSIS — Z29.9 ENCOUNTER FOR PROPHYLACTIC MEASURES, UNSPECIFIED: ICD-10-CM

## 2023-12-01 DIAGNOSIS — Z98.890 OTHER SPECIFIED POSTPROCEDURAL STATES: Chronic | ICD-10-CM

## 2023-12-01 LAB
ALBUMIN SERPL ELPH-MCNC: 3.2 G/DL — LOW (ref 3.5–5)
ALBUMIN SERPL ELPH-MCNC: 3.2 G/DL — LOW (ref 3.5–5)
ALP SERPL-CCNC: 87 U/L — SIGNIFICANT CHANGE UP (ref 40–120)
ALP SERPL-CCNC: 87 U/L — SIGNIFICANT CHANGE UP (ref 40–120)
ALT FLD-CCNC: 51 U/L DA — SIGNIFICANT CHANGE UP (ref 10–60)
ALT FLD-CCNC: 51 U/L DA — SIGNIFICANT CHANGE UP (ref 10–60)
ANION GAP SERPL CALC-SCNC: 3 MMOL/L — LOW (ref 5–17)
ANION GAP SERPL CALC-SCNC: 3 MMOL/L — LOW (ref 5–17)
AST SERPL-CCNC: 22 U/L — SIGNIFICANT CHANGE UP (ref 10–40)
AST SERPL-CCNC: 22 U/L — SIGNIFICANT CHANGE UP (ref 10–40)
BASOPHILS # BLD AUTO: 0.06 K/UL — SIGNIFICANT CHANGE UP (ref 0–0.2)
BASOPHILS # BLD AUTO: 0.06 K/UL — SIGNIFICANT CHANGE UP (ref 0–0.2)
BASOPHILS NFR BLD AUTO: 0.6 % — SIGNIFICANT CHANGE UP (ref 0–2)
BASOPHILS NFR BLD AUTO: 0.6 % — SIGNIFICANT CHANGE UP (ref 0–2)
BILIRUB SERPL-MCNC: 0.4 MG/DL — SIGNIFICANT CHANGE UP (ref 0.2–1.2)
BILIRUB SERPL-MCNC: 0.4 MG/DL — SIGNIFICANT CHANGE UP (ref 0.2–1.2)
BUN SERPL-MCNC: 14 MG/DL — SIGNIFICANT CHANGE UP (ref 7–18)
BUN SERPL-MCNC: 14 MG/DL — SIGNIFICANT CHANGE UP (ref 7–18)
CALCIUM SERPL-MCNC: 8.5 MG/DL — SIGNIFICANT CHANGE UP (ref 8.4–10.5)
CALCIUM SERPL-MCNC: 8.5 MG/DL — SIGNIFICANT CHANGE UP (ref 8.4–10.5)
CHLORIDE SERPL-SCNC: 107 MMOL/L — SIGNIFICANT CHANGE UP (ref 96–108)
CHLORIDE SERPL-SCNC: 107 MMOL/L — SIGNIFICANT CHANGE UP (ref 96–108)
CO2 SERPL-SCNC: 28 MMOL/L — SIGNIFICANT CHANGE UP (ref 22–31)
CO2 SERPL-SCNC: 28 MMOL/L — SIGNIFICANT CHANGE UP (ref 22–31)
CREAT SERPL-MCNC: 0.81 MG/DL — SIGNIFICANT CHANGE UP (ref 0.5–1.3)
CREAT SERPL-MCNC: 0.81 MG/DL — SIGNIFICANT CHANGE UP (ref 0.5–1.3)
EGFR: 108 ML/MIN/1.73M2 — SIGNIFICANT CHANGE UP
EGFR: 108 ML/MIN/1.73M2 — SIGNIFICANT CHANGE UP
EOSINOPHIL # BLD AUTO: 0.29 K/UL — SIGNIFICANT CHANGE UP (ref 0–0.5)
EOSINOPHIL # BLD AUTO: 0.29 K/UL — SIGNIFICANT CHANGE UP (ref 0–0.5)
EOSINOPHIL NFR BLD AUTO: 2.8 % — SIGNIFICANT CHANGE UP (ref 0–6)
EOSINOPHIL NFR BLD AUTO: 2.8 % — SIGNIFICANT CHANGE UP (ref 0–6)
GLUCOSE SERPL-MCNC: 121 MG/DL — HIGH (ref 70–99)
GLUCOSE SERPL-MCNC: 121 MG/DL — HIGH (ref 70–99)
HCT VFR BLD CALC: 41.2 % — SIGNIFICANT CHANGE UP (ref 39–50)
HCT VFR BLD CALC: 41.2 % — SIGNIFICANT CHANGE UP (ref 39–50)
HGB BLD-MCNC: 13.7 G/DL — SIGNIFICANT CHANGE UP (ref 13–17)
HGB BLD-MCNC: 13.7 G/DL — SIGNIFICANT CHANGE UP (ref 13–17)
IMM GRANULOCYTES NFR BLD AUTO: 0.6 % — SIGNIFICANT CHANGE UP (ref 0–0.9)
IMM GRANULOCYTES NFR BLD AUTO: 0.6 % — SIGNIFICANT CHANGE UP (ref 0–0.9)
LYMPHOCYTES # BLD AUTO: 2.58 K/UL — SIGNIFICANT CHANGE UP (ref 1–3.3)
LYMPHOCYTES # BLD AUTO: 2.58 K/UL — SIGNIFICANT CHANGE UP (ref 1–3.3)
LYMPHOCYTES # BLD AUTO: 24.6 % — SIGNIFICANT CHANGE UP (ref 13–44)
LYMPHOCYTES # BLD AUTO: 24.6 % — SIGNIFICANT CHANGE UP (ref 13–44)
MCHC RBC-ENTMCNC: 28.4 PG — SIGNIFICANT CHANGE UP (ref 27–34)
MCHC RBC-ENTMCNC: 28.4 PG — SIGNIFICANT CHANGE UP (ref 27–34)
MCHC RBC-ENTMCNC: 33.3 GM/DL — SIGNIFICANT CHANGE UP (ref 32–36)
MCHC RBC-ENTMCNC: 33.3 GM/DL — SIGNIFICANT CHANGE UP (ref 32–36)
MCV RBC AUTO: 85.5 FL — SIGNIFICANT CHANGE UP (ref 80–100)
MCV RBC AUTO: 85.5 FL — SIGNIFICANT CHANGE UP (ref 80–100)
MONOCYTES # BLD AUTO: 0.66 K/UL — SIGNIFICANT CHANGE UP (ref 0–0.9)
MONOCYTES # BLD AUTO: 0.66 K/UL — SIGNIFICANT CHANGE UP (ref 0–0.9)
MONOCYTES NFR BLD AUTO: 6.3 % — SIGNIFICANT CHANGE UP (ref 2–14)
MONOCYTES NFR BLD AUTO: 6.3 % — SIGNIFICANT CHANGE UP (ref 2–14)
NEUTROPHILS # BLD AUTO: 6.85 K/UL — SIGNIFICANT CHANGE UP (ref 1.8–7.4)
NEUTROPHILS # BLD AUTO: 6.85 K/UL — SIGNIFICANT CHANGE UP (ref 1.8–7.4)
NEUTROPHILS NFR BLD AUTO: 65.1 % — SIGNIFICANT CHANGE UP (ref 43–77)
NEUTROPHILS NFR BLD AUTO: 65.1 % — SIGNIFICANT CHANGE UP (ref 43–77)
NRBC # BLD: 0 /100 WBCS — SIGNIFICANT CHANGE UP (ref 0–0)
NRBC # BLD: 0 /100 WBCS — SIGNIFICANT CHANGE UP (ref 0–0)
PLATELET # BLD AUTO: 259 K/UL — SIGNIFICANT CHANGE UP (ref 150–400)
PLATELET # BLD AUTO: 259 K/UL — SIGNIFICANT CHANGE UP (ref 150–400)
POTASSIUM SERPL-MCNC: 3.8 MMOL/L — SIGNIFICANT CHANGE UP (ref 3.5–5.3)
POTASSIUM SERPL-MCNC: 3.8 MMOL/L — SIGNIFICANT CHANGE UP (ref 3.5–5.3)
POTASSIUM SERPL-SCNC: 3.8 MMOL/L — SIGNIFICANT CHANGE UP (ref 3.5–5.3)
POTASSIUM SERPL-SCNC: 3.8 MMOL/L — SIGNIFICANT CHANGE UP (ref 3.5–5.3)
PROT SERPL-MCNC: 7.6 G/DL — SIGNIFICANT CHANGE UP (ref 6–8.3)
PROT SERPL-MCNC: 7.6 G/DL — SIGNIFICANT CHANGE UP (ref 6–8.3)
RBC # BLD: 4.82 M/UL — SIGNIFICANT CHANGE UP (ref 4.2–5.8)
RBC # BLD: 4.82 M/UL — SIGNIFICANT CHANGE UP (ref 4.2–5.8)
RBC # FLD: 12.8 % — SIGNIFICANT CHANGE UP (ref 10.3–14.5)
RBC # FLD: 12.8 % — SIGNIFICANT CHANGE UP (ref 10.3–14.5)
SODIUM SERPL-SCNC: 138 MMOL/L — SIGNIFICANT CHANGE UP (ref 135–145)
SODIUM SERPL-SCNC: 138 MMOL/L — SIGNIFICANT CHANGE UP (ref 135–145)
WBC # BLD: 10.5 K/UL — SIGNIFICANT CHANGE UP (ref 3.8–10.5)
WBC # BLD: 10.5 K/UL — SIGNIFICANT CHANGE UP (ref 3.8–10.5)
WBC # FLD AUTO: 10.5 K/UL — SIGNIFICANT CHANGE UP (ref 3.8–10.5)
WBC # FLD AUTO: 10.5 K/UL — SIGNIFICANT CHANGE UP (ref 3.8–10.5)

## 2023-12-01 PROCEDURE — 99222 1ST HOSP IP/OBS MODERATE 55: CPT | Mod: GC

## 2023-12-01 PROCEDURE — 99222 1ST HOSP IP/OBS MODERATE 55: CPT

## 2023-12-01 PROCEDURE — 72100 X-RAY EXAM L-S SPINE 2/3 VWS: CPT | Mod: 26

## 2023-12-01 PROCEDURE — 72170 X-RAY EXAM OF PELVIS: CPT | Mod: 26

## 2023-12-01 RX ORDER — GABAPENTIN 400 MG/1
100 CAPSULE ORAL EVERY 8 HOURS
Refills: 0 | Status: DISCONTINUED | OUTPATIENT
Start: 2023-12-01 | End: 2023-12-01

## 2023-12-01 RX ORDER — ACETAMINOPHEN 500 MG
1000 TABLET ORAL EVERY 6 HOURS
Refills: 0 | Status: COMPLETED | OUTPATIENT
Start: 2023-12-01 | End: 2023-12-02

## 2023-12-01 RX ORDER — GABAPENTIN 400 MG/1
200 CAPSULE ORAL EVERY 8 HOURS
Refills: 0 | Status: DISCONTINUED | OUTPATIENT
Start: 2023-12-01 | End: 2023-12-03

## 2023-12-01 RX ORDER — TRAMADOL HYDROCHLORIDE 50 MG/1
50 TABLET ORAL EVERY 8 HOURS
Refills: 0 | Status: DISCONTINUED | OUTPATIENT
Start: 2023-12-01 | End: 2023-12-01

## 2023-12-01 RX ORDER — METHOCARBAMOL 500 MG/1
500 TABLET, FILM COATED ORAL THREE TIMES A DAY
Refills: 0 | Status: DISCONTINUED | OUTPATIENT
Start: 2023-12-01 | End: 2023-12-01

## 2023-12-01 RX ORDER — CILOSTAZOL 100 MG/1
1 TABLET ORAL
Refills: 0 | DISCHARGE

## 2023-12-01 RX ORDER — OXYCODONE HYDROCHLORIDE 5 MG/1
10 TABLET ORAL EVERY 4 HOURS
Refills: 0 | Status: DISCONTINUED | OUTPATIENT
Start: 2023-12-01 | End: 2023-12-01

## 2023-12-01 RX ORDER — ACETAMINOPHEN 500 MG
975 TABLET ORAL ONCE
Refills: 0 | Status: COMPLETED | OUTPATIENT
Start: 2023-12-01 | End: 2023-12-01

## 2023-12-01 RX ORDER — LIDOCAINE 4 G/100G
1 CREAM TOPICAL DAILY
Refills: 0 | Status: DISCONTINUED | OUTPATIENT
Start: 2023-12-01 | End: 2023-12-03

## 2023-12-01 RX ORDER — INFLUENZA VIRUS VACCINE 15; 15; 15; 15 UG/.5ML; UG/.5ML; UG/.5ML; UG/.5ML
0.5 SUSPENSION INTRAMUSCULAR ONCE
Refills: 0 | Status: DISCONTINUED | OUTPATIENT
Start: 2023-12-01 | End: 2023-12-03

## 2023-12-01 RX ORDER — AMLODIPINE BESYLATE 2.5 MG/1
5 TABLET ORAL DAILY
Refills: 0 | Status: DISCONTINUED | OUTPATIENT
Start: 2023-12-01 | End: 2023-12-01

## 2023-12-01 RX ORDER — METHOCARBAMOL 500 MG/1
750 TABLET, FILM COATED ORAL EVERY 8 HOURS
Refills: 0 | Status: DISCONTINUED | OUTPATIENT
Start: 2023-12-01 | End: 2023-12-03

## 2023-12-01 RX ORDER — TRAMADOL HYDROCHLORIDE 50 MG/1
50 TABLET ORAL EVERY 6 HOURS
Refills: 0 | Status: DISCONTINUED | OUTPATIENT
Start: 2023-12-01 | End: 2023-12-01

## 2023-12-01 RX ORDER — MORPHINE SULFATE 50 MG/1
4 CAPSULE, EXTENDED RELEASE ORAL ONCE
Refills: 0 | Status: DISCONTINUED | OUTPATIENT
Start: 2023-12-01 | End: 2023-12-01

## 2023-12-01 RX ORDER — DIAZEPAM 5 MG
5 TABLET ORAL ONCE
Refills: 0 | Status: DISCONTINUED | OUTPATIENT
Start: 2023-12-01 | End: 2023-12-01

## 2023-12-01 RX ORDER — KETOROLAC TROMETHAMINE 30 MG/ML
15 SYRINGE (ML) INJECTION ONCE
Refills: 0 | Status: DISCONTINUED | OUTPATIENT
Start: 2023-12-01 | End: 2023-12-01

## 2023-12-01 RX ORDER — TRAMADOL HYDROCHLORIDE 50 MG/1
50 TABLET ORAL
Refills: 0 | Status: DISCONTINUED | OUTPATIENT
Start: 2023-12-01 | End: 2023-12-01

## 2023-12-01 RX ORDER — AMLODIPINE BESYLATE 2.5 MG/1
1 TABLET ORAL
Refills: 0 | DISCHARGE

## 2023-12-01 RX ORDER — KETOROLAC TROMETHAMINE 30 MG/ML
10 SYRINGE (ML) INJECTION EVERY 8 HOURS
Refills: 0 | Status: DISCONTINUED | OUTPATIENT
Start: 2023-12-01 | End: 2023-12-02

## 2023-12-01 RX ORDER — METHOCARBAMOL 500 MG/1
750 TABLET, FILM COATED ORAL EVERY 8 HOURS
Refills: 0 | Status: DISCONTINUED | OUTPATIENT
Start: 2023-12-01 | End: 2023-12-01

## 2023-12-01 RX ORDER — CYCLOBENZAPRINE HYDROCHLORIDE 10 MG/1
10 TABLET, FILM COATED ORAL ONCE
Refills: 0 | Status: COMPLETED | OUTPATIENT
Start: 2023-12-01 | End: 2023-12-01

## 2023-12-01 RX ORDER — ACETAMINOPHEN 500 MG
650 TABLET ORAL EVERY 6 HOURS
Refills: 0 | Status: DISCONTINUED | OUTPATIENT
Start: 2023-12-01 | End: 2023-12-01

## 2023-12-01 RX ORDER — TRAMADOL HYDROCHLORIDE 50 MG/1
50 TABLET ORAL EVERY 8 HOURS
Refills: 0 | Status: DISCONTINUED | OUTPATIENT
Start: 2023-12-01 | End: 2023-12-03

## 2023-12-01 RX ORDER — OXYCODONE HYDROCHLORIDE 5 MG/1
5 TABLET ORAL EVERY 4 HOURS
Refills: 0 | Status: DISCONTINUED | OUTPATIENT
Start: 2023-12-01 | End: 2023-12-01

## 2023-12-01 RX ORDER — SENNA PLUS 8.6 MG/1
2 TABLET ORAL AT BEDTIME
Refills: 0 | Status: DISCONTINUED | OUTPATIENT
Start: 2023-12-01 | End: 2023-12-03

## 2023-12-01 RX ORDER — ENOXAPARIN SODIUM 100 MG/ML
40 INJECTION SUBCUTANEOUS EVERY 24 HOURS
Refills: 0 | Status: DISCONTINUED | OUTPATIENT
Start: 2023-12-01 | End: 2023-12-01

## 2023-12-01 RX ORDER — OXYCODONE HYDROCHLORIDE 5 MG/1
5 TABLET ORAL ONCE
Refills: 0 | Status: DISCONTINUED | OUTPATIENT
Start: 2023-12-01 | End: 2023-12-01

## 2023-12-01 RX ORDER — NALOXONE HYDROCHLORIDE 4 MG/.1ML
0.4 SPRAY NASAL ONCE
Refills: 0 | Status: DISCONTINUED | OUTPATIENT
Start: 2023-12-01 | End: 2023-12-03

## 2023-12-01 RX ORDER — GABAPENTIN 400 MG/1
200 CAPSULE ORAL DAILY
Refills: 0 | Status: DISCONTINUED | OUTPATIENT
Start: 2023-12-01 | End: 2023-12-01

## 2023-12-01 RX ORDER — AMLODIPINE BESYLATE 2.5 MG/1
5 TABLET ORAL DAILY
Refills: 0 | Status: DISCONTINUED | OUTPATIENT
Start: 2023-12-01 | End: 2023-12-03

## 2023-12-01 RX ORDER — ENOXAPARIN SODIUM 100 MG/ML
40 INJECTION SUBCUTANEOUS EVERY 12 HOURS
Refills: 0 | Status: DISCONTINUED | OUTPATIENT
Start: 2023-12-01 | End: 2023-12-03

## 2023-12-01 RX ORDER — POLYETHYLENE GLYCOL 3350 17 G/17G
17 POWDER, FOR SOLUTION ORAL DAILY
Refills: 0 | Status: DISCONTINUED | OUTPATIENT
Start: 2023-12-01 | End: 2023-12-03

## 2023-12-01 RX ADMIN — CYCLOBENZAPRINE HYDROCHLORIDE 10 MILLIGRAM(S): 10 TABLET, FILM COATED ORAL at 11:39

## 2023-12-01 RX ADMIN — Medication 5 MILLIGRAM(S): at 00:47

## 2023-12-01 RX ADMIN — Medication 15 MILLIGRAM(S): at 01:22

## 2023-12-01 RX ADMIN — OXYCODONE HYDROCHLORIDE 5 MILLIGRAM(S): 5 TABLET ORAL at 06:27

## 2023-12-01 RX ADMIN — Medication 1000 MILLIGRAM(S): at 18:56

## 2023-12-01 RX ADMIN — ENOXAPARIN SODIUM 40 MILLIGRAM(S): 100 INJECTION SUBCUTANEOUS at 18:56

## 2023-12-01 RX ADMIN — SENNA PLUS 2 TABLET(S): 8.6 TABLET ORAL at 23:09

## 2023-12-01 RX ADMIN — Medication 15 MILLIGRAM(S): at 11:39

## 2023-12-01 RX ADMIN — POLYETHYLENE GLYCOL 3350 17 GRAM(S): 17 POWDER, FOR SOLUTION ORAL at 14:05

## 2023-12-01 RX ADMIN — LIDOCAINE 1 PATCH: 4 CREAM TOPICAL at 14:04

## 2023-12-01 RX ADMIN — Medication 975 MILLIGRAM(S): at 01:22

## 2023-12-01 RX ADMIN — LIDOCAINE 1 PATCH: 4 CREAM TOPICAL at 20:16

## 2023-12-01 RX ADMIN — OXYCODONE HYDROCHLORIDE 5 MILLIGRAM(S): 5 TABLET ORAL at 07:22

## 2023-12-01 RX ADMIN — METHOCARBAMOL 750 MILLIGRAM(S): 500 TABLET, FILM COATED ORAL at 18:57

## 2023-12-01 RX ADMIN — TRAMADOL HYDROCHLORIDE 50 MILLIGRAM(S): 50 TABLET ORAL at 15:41

## 2023-12-01 RX ADMIN — Medication 10 MILLIGRAM(S): at 23:08

## 2023-12-01 RX ADMIN — METHOCARBAMOL 750 MILLIGRAM(S): 500 TABLET, FILM COATED ORAL at 23:10

## 2023-12-01 RX ADMIN — GABAPENTIN 200 MILLIGRAM(S): 400 CAPSULE ORAL at 23:10

## 2023-12-01 RX ADMIN — Medication 975 MILLIGRAM(S): at 11:39

## 2023-12-01 RX ADMIN — Medication 15 MILLIGRAM(S): at 00:47

## 2023-12-01 RX ADMIN — MORPHINE SULFATE 4 MILLIGRAM(S): 50 CAPSULE, EXTENDED RELEASE ORAL at 11:20

## 2023-12-01 RX ADMIN — MORPHINE SULFATE 4 MILLIGRAM(S): 50 CAPSULE, EXTENDED RELEASE ORAL at 08:40

## 2023-12-01 RX ADMIN — Medication 975 MILLIGRAM(S): at 00:47

## 2023-12-01 NOTE — PATIENT PROFILE ADULT - FALL HARM RISK - RISK INTERVENTIONS

## 2023-12-01 NOTE — H&P ADULT - PROBLEM SELECTOR PLAN 1
p/w bilateral lower  back pain R>L, episode of electric shock down both legs,   + straight leg test,   hx of spinal sugrery in 2014.   Surgeon is Dr. Ledesma   started on Tylenol for mild pain, and oxycodone for moderate and sever pain.  f/u xray : pelvis  f/u xray lumbar spine AP  f/u PT consults p/w bilateral lower  back pain R>L, episode of electric shock down both legs,   + straight leg test,   hx of spinal sugrery in 2014.   Surgeon is Dr. Ledesma   started on Tylenol for mild pain, and tramdol for moderate pain   neuropathic pain : gabapentin and Methocarbamol  f/u xray : pelvis  f/u xray lumbar spine AP  f/u PT consults  f/u pain management   f/u with neurosurgery p/w bilateral lower  back pain R>L, episode of electric shock down both legs,   + straight leg test,   hx of spinal sugrery in 2014.   Surgeon is Dr. Ledesma   pain management: robaxin 750, tylenol, BELKIS 200, tramadol q8  neuropathic pain : gabapentin and Methocarbamol  f/u xray : pelvis  f/u xray lumbar spine AP  f/u PT consult  f/u with neurosurgery

## 2023-12-01 NOTE — ED ADULT NURSE NOTE - OBJECTIVE STATEMENT
lower back pain had back Sx 2014 , back pain coming back every winter when weather is cold , denies numbness or weakness to extremities.

## 2023-12-01 NOTE — H&P ADULT - HISTORY OF PRESENT ILLNESS
48-year-old male hx of HTN, hx of spinal surgery, symptomatic bradycardic s/p PPM, presenting with bilateral  lower back pain R>L for the past day. Was sitting yesterday around 2pm and got up  when the pain started. Felt an electric  shock down his legs and has been unable to walk since the pain started.  Pain is worse when he straightens his legs or tries to lift them. Better when he bends is his knees Took tramadol at home with no relief. . Denies saddle anesthesia, urinary or fecal incontinence  fever, chills, hx of cancer, abdominal pain or diarrhea.    Of note he had a pacemaker placed at [facility] two weeks ago.   48-year-old male hx of HTN, hx of spinal surgery, symptomatic bradycardic s/p PPM, presenting with bilateral  lower back pain R>L for the past day. Was sitting yesterday around 2pm and got up  when the pain started. Felt an electric  shock down his legs and has been unable to walk since the pain started.  Pain is worse when he straightens his legs or tries to lift them. Better when he bends is his knees Took tramadol at home with no relief. . Denies recent fall or trauma,  saddle anesthesia, urinary or fecal incontinence  fever, chills, hx of cancer, abdominal pain or diarrhea.    Of note he had a pacemaker placed at [facility] two weeks ago.

## 2023-12-01 NOTE — ED PROVIDER NOTE - ED STEMI HIDDEN
Subjective:       Patient ID: Tomeka Burns is a 67 y.o. female.    Chief Complaint: Follow-up, Diabetes, and Hypertension    67 years old female came to the clinic for diabetes follow-up.  Last A1c was elevated.  No polyuria, polydipsia or polyphagia.  Blood pressure today was stable.  No chest pain, palpitation, orthopnea or PND.  Patient due for screening mammogram.    Review of Systems   Constitutional: Negative.    HENT: Negative.    Eyes: Negative.    Respiratory: Negative.    Gastrointestinal: Negative.    Genitourinary: Negative.    Musculoskeletal: Negative.    Neurological: Negative.    Psychiatric/Behavioral: Negative.          Objective:      Physical Exam  Constitutional:       General: She is not in acute distress.     Appearance: She is well-developed. She is not diaphoretic.   HENT:      Head: Normocephalic and atraumatic.      Right Ear: External ear normal.      Left Ear: External ear normal.      Nose: Nose normal.      Mouth/Throat:      Pharynx: No oropharyngeal exudate.   Eyes:      General: No scleral icterus.        Right eye: No discharge.         Left eye: No discharge.      Conjunctiva/sclera: Conjunctivae normal.      Pupils: Pupils are equal, round, and reactive to light.   Neck:      Thyroid: No thyromegaly.      Vascular: No JVD.      Trachea: No tracheal deviation.   Cardiovascular:      Rate and Rhythm: Normal rate and regular rhythm.      Heart sounds: Normal heart sounds. No murmur heard.    No friction rub. No gallop.   Pulmonary:      Effort: Pulmonary effort is normal. No respiratory distress.      Breath sounds: Normal breath sounds. No stridor. No wheezing or rales.   Chest:      Chest wall: No tenderness.   Abdominal:      General: Bowel sounds are normal. There is no distension.      Palpations: Abdomen is soft. There is no mass.      Tenderness: There is no abdominal tenderness. There is no guarding or rebound.   Musculoskeletal:         General: No tenderness. Normal range  of motion.      Cervical back: Normal range of motion and neck supple.   Lymphadenopathy:      Cervical: No cervical adenopathy.   Skin:     General: Skin is warm and dry.      Coloration: Skin is not pale.      Findings: No erythema or rash.   Neurological:      Mental Status: She is alert and oriented to person, place, and time.      Cranial Nerves: No cranial nerve deficit.      Motor: No abnormal muscle tone.      Coordination: Coordination normal.      Deep Tendon Reflexes: Reflexes are normal and symmetric.   Psychiatric:         Behavior: Behavior normal.         Thought Content: Thought content normal.         Judgment: Judgment normal.         Assessment:       Problem List Items Addressed This Visit     Type 2 diabetes mellitus with hyperglycemia, without long-term current use of insulin    Relevant Orders    Comprehensive Metabolic Panel    Hemoglobin A1C    Lipid Panel    Microalbumin/Creatinine Ratio, Urine    Essential hypertension - Primary    Relevant Orders    Comprehensive Metabolic Panel    Lipid Panel      Other Visit Diagnoses     Encounter for screening mammogram for breast cancer        Relevant Orders    Mammo Digital Screening Bilat          Plan:           Tomeka was seen today for follow-up, diabetes and hypertension.    Diagnoses and all orders for this visit:    Essential hypertension  -     Comprehensive Metabolic Panel; Future  -     Lipid Panel; Future    Encounter for screening mammogram for breast cancer  -     Mammo Digital Screening Bilat; Future    Type 2 diabetes mellitus with hyperglycemia, without long-term current use of insulin  -     Comprehensive Metabolic Panel; Future  -     Hemoglobin A1C; Future  -     Lipid Panel; Future  -     Microalbumin/Creatinine Ratio, Urine; Future      Continue monitoring blood pressure at home, low sodium diet.  Continue monitoring blood sugar at home,ADA diet.   Quality 226: Preventive Care And Screening: Tobacco Use: Screening And Cessation Intervention: Patient screened for tobacco use and is an ex/non-smoker Detail Level: Generalized hide Quality 431: Preventive Care And Screening: Unhealthy Alcohol Use - Screening: Patient screened for unhealthy alcohol use using a single question and scores less than 2 times per year

## 2023-12-01 NOTE — ED PROVIDER NOTE - CLINICAL SUMMARY MEDICAL DECISION MAKING FREE TEXT BOX
48-year-old male hx of HTN, sympomatic bradycardic s/p PPM, presenting with L lower back pain for the past day. No s/s of cauda equina or cord compression. No need for imaging. Pain meds provided, still with pain. Will give another round of medications and reassess.

## 2023-12-01 NOTE — ED PROVIDER NOTE - PROGRESS NOTE DETAILS
Dany Tay DO: Patient received at sign out. Pending tests/tentative plan: xr, reassessment.  Results are discussed with the patient. All questions answered. Pt is still in pain - pain mgmt. D/w hospitalist and MAR - agree with admisison

## 2023-12-01 NOTE — CONSULT NOTE ADULT - ASSESSMENT
Confidential Drug Utilization Report  Search Terms: Ronni Paiz, 1975Search Date: 12/01/2023 15:34:05 PM  The Drug Utilization Report below displays all of the controlled substance prescriptions, if any, that your patient has filled in the last twelve months. The information displayed on this report is compiled from pharmacy submissions to the Department, and accurately reflects the information as submitted by the pharmacies.    This report was requested by: Megan Dave | Reference #: [id number]    You have not added a REGINALD number. Keeping your REGINALD number(s) up to date on the My REGINALD # tab will enable the separation of your prescriptions from others in the search results.    Practitioner Count: 0  Pharmacy Count: 0  Current Opioid Prescriptions: 0  Current Benzodiazepine Prescriptions: 0  Current Stimulant Prescriptions: 0      Patient Demographic Information (PDI)       PDI	First Name	Last Name	Birth Date	Gender	Street Address	[facility] Code  MICHELLE Paiz	1975	Male	[address]	[address]    Prescription Information      PDI Filter:    PDI	Current Rx	Drug Type	Rx Written	Rx Dispensed	Drug	Quantity	Days Supply	Prescriber Name	Prescriber REGINALD #	Payment Method	Dispenser  A	N	O	07/24/2023	07/26/2023	tramadol hcl 50 mg tablet	30	30	Andrew Ledesma	[id number]	Medicaid	[id number]  A	N	O	06/05/2023	06/13/2023	tramadol hcl 50 mg tablet	30	30	Baltazar Andrew	[id number]	Medicaid	[id number]  A	N	O	05/10/2023	05/11/2023	tramadol hcl 50 mg tablet	30	30	Baltazar, Andrew	[id number]	Medicaid	[id number]  A	N	O	01/07/2023	01/07/2023	tramadol hcl 50 mg tablet	30	30	Baltazar Andrew	[id number]	Medicaid	[id number]    * - Details of Drug Type : O = Opioid, B = Benzodiazepine, S = Stimulant    * - Drugs marked with an asterisk are compound drugs. If the compound drug is made up of more than one controlled substance, then each controlled substance will be a separate row in the table.

## 2023-12-01 NOTE — CONSULT NOTE ADULT - PROBLEM SELECTOR RECOMMENDATION 9
Pt with acute pain to lowerback radiating to BLE which is nociceptive and neuropathic in nature likely due to radiculopathy. AP Pelvis- with No acute radiographic osseous pathology. F/U with CT/MRI recommended if unable to walk. Ortho spine consult recommended. Pending PT evaluation.  Opioid pain recommendations   - Start Tramadol 50 mg PO q 8 hours PRN severe pain. Monitor for sedation/ respiratory depression.   Non-opioid pain recommendations   - Toradol 10 mg po q8h x 1 day per primary team. Monitor Renal function.  - Continue Acetaminophen 1 gram PO q 6 hours x 1 days. Then q8h x 3 days. Monitor LFTs  - Start Robaxin to 750 mg q 8hrs x 5 days. First dose now. Monitor for sedation   - Continue Lidoderm 4% patch daily. (12 hrs on/12 hrs off)  - Start Gabapentin 200 mg po q8h. Monitor renal function.  Bowel Regimen  - Continue Miralax 17G PO daily  - Continue Senna 2 tablets at bedtime for constipation.  Mild pain (score 1-3)  - Non-pharmacological pain treatment recommendations  - Warm/ Cool packs PRN   - Repositioning extremity, elevation, imagery, relaxation, distraction.  - Physical therapy OOB if no contraindications   Recommendations discussed with primary team and RN.

## 2023-12-01 NOTE — ED PROVIDER NOTE - WR INTERPRETATION DATE TIME  2
pt calm & cooperative d/c by NP pt currently denies Si/Hi/AVh presently pt transported via EMS to [facility].
01-Dec-2023 10:59

## 2023-12-01 NOTE — H&P ADULT - NSHPREVIEWOFSYSTEMS_GEN_ALL_CORE
REVIEW OF SYSTEMS:    CONSTITUTIONAL: No fever, chills, or weakness.   EYES/ENT: No visual changes;  No ear pain, runny nose, or sore throat.   NECK: No pain or stiffness.  RESPIRATORY: No cough, wheezing, hemoptysis; No shortness of breath.  CARDIOVASCULAR: No chest pain, dyspnea on exertion, or palpitations.  GASTROINTESTINAL: No abdominal or epigastric pain. No nausea, vomiting, or hematemesis; No diarrhea or constipation. No melena or hematochezia.  GENITOURINARY: No dysuria, frequency or hematuria.  NEUROLOGICAL: + weakness in his legs  msk: + back pain worse with moving his legs   SKIN: No itching, rashes.

## 2023-12-01 NOTE — CONSULT NOTE ADULT - SUBJECTIVE AND OBJECTIVE BOX
Source of information: MALCOLM HYATT, Chart review  Patient language: English  : n/a    HPI:  48-year-old male hx of HTN, hx of spinal surgery, symptomatic bradycardic s/p PPM, presenting with bilateral  lower back pain R>L for the past day. Was sitting yesterday around 2pm and got up  when the pain started. Felt an electric  shock down his legs and has been unable to walk since the pain started.  Pain is worse when he straightens his legs or tries to lift them. Better when he bends is his knees Took tramadol at home with no relief. . Denies recent fall or trauma,  saddle anesthesia, urinary or fecal incontinence  fever, chills, hx of cancer, abdominal pain or diarrhea.    Of note he had a pacemaker placed at [facility] two weeks ago.   (01 Dec 2023 11:29)      Patient is a 48y old  Male who presents with a chief complaint of back pain (01 Dec 2023 11:29)  . Pt is admitted for ..., being treated with .... Pain consulted for .... Pt seen and examined at bedside. Reports pain score ***  SCALE USED: (1-10 VNRS). Pt describes pain as .... radiating to... alleviated by pain medication... exacerbated by movement... Pt tolerating PO diet. Denies lethargy, nausea, vomiting, constipation, itchiness. Reports last BM ***. Patient stated goal for pain control: to be able to take deep breaths, get out of bed to chair and ambulate with tolerable pain control. Pt denies taking medications for pain at home.     PAST MEDICAL & SURGICAL HISTORY:  HTN (hypertension)      Bradycardia      H/O Spinal surgery          FAMILY HISTORY:      Social History:   [ ] Denies ETOH use, illicit drug use and smoking    Allergies    No Known Allergies    Intolerances        MEDICATIONS  (STANDING):  amLODIPine   Tablet 5 milliGRAM(s) Oral daily  enoxaparin Injectable 40 milliGRAM(s) SubCutaneous every 12 hours  gabapentin 200 milliGRAM(s) Oral daily  lidocaine   4% Patch 1 Patch Transdermal daily  methocarbamol 500 milliGRAM(s) Oral three times a day  naloxone Injectable 0.4 milliGRAM(s) IV Push once  polyethylene glycol 3350 17 Gram(s) Oral daily  senna 2 Tablet(s) Oral at bedtime    MEDICATIONS  (PRN):  acetaminophen     Tablet .. 650 milliGRAM(s) Oral every 6 hours PRN Temp greater or equal to 38C (100.4F), Mild Pain (1 - 3)  bisacodyl 5 milliGRAM(s) Oral daily PRN Constipation  traMADol 50 milliGRAM(s) Oral every 6 hours PRN Moderate Pain (4 - 6)      Vital Signs Last 24 Hrs  T(C): 36.7 (01 Dec 2023 12:22), Max: 37.2 (30 Nov 2023 22:05)  T(F): 98 (01 Dec 2023 12:22), Max: 99 (30 Nov 2023 22:05)  HR: 76 (01 Dec 2023 12:22) (65 - 82)  BP: 149/88 (01 Dec 2023 12:22) (126/65 - 152/83)  BP(mean): --  RR: 18 (01 Dec 2023 12:22) (18 - 20)  SpO2: 96% (01 Dec 2023 12:22) (95% - 96%)    Parameters below as of 01 Dec 2023 12:22  Patient On (Oxygen Delivery Method): room air        LABS: Reviewed.                          13.7   10.50 )-----------( 259      ( 01 Dec 2023 06:30 )             41.2     12-01    138  |  107  |  14  ----------------------------<  121<H>  3.8   |  28  |  0.81    Ca    8.5      01 Dec 2023 06:30    TPro  7.6  /  Alb  3.2<L>  /  TBili  0.4  /  DBili  x   /  AST  22  /  ALT  51  /  AlkPhos  87  12-01      LIVER FUNCTIONS - ( 01 Dec 2023 06:30 )  Alb: 3.2 g/dL / Pro: 7.6 g/dL / ALK PHOS: 87 U/L / ALT: 51 U/L DA / AST: 22 U/L / GGT: x           Urinalysis Basic - ( 01 Dec 2023 06:30 )    Color: x / Appearance: x / SG: x / pH: x  Gluc: 121 mg/dL / Ketone: x  / Bili: x / Urobili: x   Blood: x / Protein: x / Nitrite: x   Leuk Esterase: x / RBC: x / WBC x   Sq Epi: x / Non Sq Epi: x / Bacteria: x      CAPILLARY BLOOD GLUCOSE    Radiology: n/a    ORT Score -   Family Hx of substance abuse	Female	      Male  Alcohol 	                                           1                     3  Illegal drugs	                                   2                     3  Rx drugs                                           4 	                  4  Personal Hx of substance abuse		  Alcohol 	                                          3	                  3  Illegal drugs                                     4	                  4  Rx drugs                                            5 	                  5  Age between 16- 45 years	           1                     1  hx preadolescent sexual abuse	   3 	                  0  Psychological disease		  ADD, OCD, bipolar, schizophrenia   2	          2  Depression                                           1 	          1  Total: 0    a score of 3 or lower indicates low risk for opioid abuse		  a score of 4-7 indicates moderate risk for opioid abuse		  a score of 8 or higher indicates high risk for opioid abuse  	  4AT (Assessment test for delirium & cognitive impairment)  _________________________________________________________  [1] ALERTNESS  This includes patients who may be markedly drowsy (eg. difficult to rouse and/or obviously sleepy  during assessment) or agitated/hyperactive. Observe the patient. If asleep, attempt to wake with  speech or gentle touch on shoulder. Ask the patient to state their name and address to assist rating.  Normal (fully alert, but not agitated, throughout assessment) 0  Mild sleepiness for <10 seconds after waking, then normal 0  Clearly abnormal 4    [2] AMT4  Age, date of birth, place (name of the hospital or building), current year.  No mistakes 0  1 mistake 1  2 or more mistakes/untestable 2    [3] ATTENTION  Ask the patient: “Please tell me the months of the year in backwards order, starting at December.”  To assist initial understanding one prompt of “what is the month before December?” is permitted.  Months of the year backwards Achieves 7 months or more correctly 0  Starts but scores <7 months / refuses to start 1   Untestable (cannot start because unwell, drowsy, inattentive) 2    [4] ACUTE CHANGE OR FLUCTUATING COURSE  Evidence of significant change or fluctuation in: alertness, cognition, other mental function  (eg. paranoia, hallucinations) arising over the last 2 weeks and still evident in last 24hrs  No 0  Yes 4    4 or above: possible delirium +/- cognitive impairment  1-3: possible cognitive impairment  0: delirium or severe cognitive impairment unlikely (but delirium still possible if [4] information incomplete)    4AT SCORE: 0    REVIEW OF SYSTEMS:  CONSTITUTIONAL: No fever or fatigue  HEENT:  No difficulty hearing, no change in vision  NECK: No pain or stiffness  RESPIRATORY: No cough, wheezing, chills or hemoptysis; No shortness of breath  CARDIOVASCULAR: No chest pain, palpitations, dizziness, or leg swelling  GASTROINTESTINAL: No loss of appetite, decreased PO intake. No abdominal or epigastric pain. No nausea, vomiting; No diarrhea or constipation.   GENITOURINARY: No dysuria, frequency, hematuria, retention or incontinence  MUSCULOSKELETAL: No joint pain or swelling; No muscle, back, or extremity pain, no upper or lower motor strength weakness, no saddle anesthesia, bowel/bladder incontinence, no falls   NEURO: No headaches, No numbness/tingling b/l LE, No weakness  ENDOCRINE: No polyuria, polydipsia, heat or cold intolerance; No hair loss  PSYCHIATRIC: No depression, anxiety or difficulty sleeping    PHYSICAL EXAM:  GENERAL:  Alert & Oriented X4, cooperative, NAD, Good concentration. Speech is clear.   RESPIRATORY: Respirations even and unlabored. Clear to auscultation bilaterally; No rales, rhonchi, wheezing, or rubs  CARDIOVASCULAR: Normal S1/S2, regular rate and rhythm; No murmurs, rubs, or gallops. No JVD.   GASTROINTESTINAL:  Soft, Nontender, Nondistended; Bowel sounds present  PERIPHERAL VASCULAR:  Extremities warm without edema. 2+ Peripheral Pulses, No cyanosis, No calf tenderness  MUSCULOSKELETAL: Motor Strength 5/5 B/L upper and lower extremities; moves all extremities equally against gravity; ROM intact; negative SLR; No tenderness on palpation of all joints.   SKIN: Warm, dry, intact. No rashes, lesions, scars or wounds.     Risk factors associated with adverse outcomes related to opioid treatment  [ ]  Concurrent benzodiazepine use  [ ]  History/ Active substance use or alcohol use disorder  [ ] Psychiatric co-morbidity  [ ] Sleep apnea  [ ] COPD  [ ] BMI> 35  [ ] Liver dysfunction  [ ] Renal dysfunction  [ ] CHF  [ ] Smoker  [ ]  Age > 60 years    [ ]  [place]  Reviewed and Copied to Chart. See below.    Plan of care and goal oriented pain management treatment options were discussed with patient and /or primary care giver; all questions and concerns were addressed and care was aligned with patient's wishes.    Educated patient on goal oriented pain management treatment options        Source of information: MALCOLM HYATT, Chart review  Patient language: Malagasy  : n/a    HPI:  48-year-old male hx of HTN, hx of spinal surgery, symptomatic bradycardic s/p PPM, presenting with bilateral  lower back pain R>L for the past day. Was sitting yesterday around 2pm and got up  when the pain started. Felt an electric  shock down his legs and has been unable to walk since the pain started.  Pain is worse when he straightens his legs or tries to lift them. Better when he bends is his knees Took tramadol at home with no relief. . Denies recent fall or trauma,  saddle anesthesia, urinary or fecal incontinence  fever, chills, hx of cancer, abdominal pain or diarrhea.    Of note he had a pacemaker placed at [facility] two weeks ago.   (01 Dec 2023 11:29)    AP Pelvis : IMPRESSION:   No acute radiographic osseous pathology..  If pain persist despite conservative therapy and patient is unable to   walk, a follow-up noncontrast CT/MRI scan recommended to exclude occult   fractures or soft tissue injuries not evident on plain film radiography.    Pt is admitted for lower backpain radiating to BLE. Pt medicated with Flexeril, Toradol IV, Morphine IV and Oxycodone po in ED with minimal relief. Pain consulted for acute backpain 12/1/23 . Pt seen and examined at bedside in ED this afternoon, with wife at bedside. AP Pelvis -No acute radiographic osseous pathology. Patient found lying in stretcher, awake, alert and oriented x 3, speech clear. Pt is Malagasy speaking, no  needed at this time. with Pt reports lower backpain, 7/10 score, currently at rest. SCALE USED: (1-10 VNRS). Pt describes pain as constant, tight and shooting pain radiating to BLE, minimally alleviated by pain medication and exacerbated by movement. Pt stating having hx of spinal surgery on 2014 for compression fractures and hernia. Pt has been following MD Ledesma who has been doing injection therapy for his lower backpain every 4 months, last 4 months ago. Also, pt stating that MD Ledesma sent for radiology follow up and his next appointment to see him is 12/7/23. Pt also taking Tramadol 50 mg po q8h, last ordred on 7/26/23 per [facility]. Pt stating that he has been taking Tramadol 50 mg po 3x per week and taking more Tylenol for his moderate pain with some relief. Pt reports was doing fairly ok until yesterday, that while sitting in his living room got up and start having severe lower backpain with shooting electric pain to BLE and unable to stand up or walk. Pt tolerating PO diet. Denies lethargy, nausea, vomiting, constipation, itchiness. Reports last BM 11/30 yesterday. Patient stated goal for pain control: to be able to take deep breaths, get out of bed to chair and ambulate with tolerable pain control. Pt reports ambulating with cane at home baseline. Pending PT evaluation.    PAST MEDICAL & SURGICAL HISTORY:  HTN (hypertension)    Bradycardia    H/O Spinal surgery    FAMILY HISTORY:    Social History:   [x] Denies ETOH use, illicit drug use and smoking    Allergies    No Known Allergies    Intolerances    MEDICATIONS  (STANDING):  amLODIPine   Tablet 5 milliGRAM(s) Oral daily  enoxaparin Injectable 40 milliGRAM(s) SubCutaneous every 12 hours  gabapentin 200 milliGRAM(s) Oral daily  lidocaine   4% Patch 1 Patch Transdermal daily  methocarbamol 500 milliGRAM(s) Oral three times a day  naloxone Injectable 0.4 milliGRAM(s) IV Push once  polyethylene glycol 3350 17 Gram(s) Oral daily  senna 2 Tablet(s) Oral at bedtime    MEDICATIONS  (PRN):  acetaminophen     Tablet .. 650 milliGRAM(s) Oral every 6 hours PRN Temp greater or equal to 38C (100.4F), Mild Pain (1 - 3)  bisacodyl 5 milliGRAM(s) Oral daily PRN Constipation  traMADol 50 milliGRAM(s) Oral every 6 hours PRN Moderate Pain (4 - 6)    Vital Signs Last 24 Hrs  T(C): 36.7 (01 Dec 2023 12:22), Max: 37.2 (30 Nov 2023 22:05)  T(F): 98 (01 Dec 2023 12:22), Max: 99 (30 Nov 2023 22:05)  HR: 76 (01 Dec 2023 12:22) (65 - 82)  BP: 149/88 (01 Dec 2023 12:22) (126/65 - 152/83)  BP(mean): --  RR: 18 (01 Dec 2023 12:22) (18 - 20)  SpO2: 96% (01 Dec 2023 12:22) (95% - 96%)    Parameters below as of 01 Dec 2023 12:22  Patient On (Oxygen Delivery Method): room air    LABS: Reviewed.                        13.7   10.50 )-----------( 259      ( 01 Dec 2023 06:30 )             41.2     12-01    138  |  107  |  14  ----------------------------<  121<H>  3.8   |  28  |  0.81    Ca    8.5      01 Dec 2023 06:30    TPro  7.6  /  Alb  3.2<L>  /  TBili  0.4  /  DBili  x   /  AST  22  /  ALT  51  /  AlkPhos  87  12-01    LIVER FUNCTIONS - ( 01 Dec 2023 06:30 )  Alb: 3.2 g/dL / Pro: 7.6 g/dL / ALK PHOS: 87 U/L / ALT: 51 U/L DA / AST: 22 U/L / GGT: x           Urinalysis Basic - ( 01 Dec 2023 06:30 )    Color: x / Appearance: x / SG: x / pH: x  Gluc: 121 mg/dL / Ketone: x  / Bili: x / Urobili: x   Blood: x / Protein: x / Nitrite: x   Leuk Esterase: x / RBC: x / WBC x   Sq Epi: x / Non Sq Epi: x / Bacteria: x    CAPILLARY BLOOD GLUCOSE    Radiology:   ACC: [id number] EXAM:  XR PELVIS AP ONLY 1-2 VIEWS   ORDERED BY: FELECIA ZENG     PROCEDURE DATE:  12/01/2023      INTERPRETATION:  Radiograph of the pelvis    CLINICAL INFORMATION: Injury with Pain.    TECHNIQUE: AP pelvic view.    FINDINGS:  No prior similar studies are available for review.    Pelvic bones are intact.   No fracture or hip dislocation.  The sacroiliac joints and pubic symphysis are intact.   No pathologic calcifications.  Soft tissues unremarkable.    Bowel overlies and obscures portions of the sacrum and iliac bone.    IMPRESSION:   No acute radiographic osseous pathology..  If pain persist despite conservative therapy and patient is unable to   walk, a follow-up noncontrast CT/MRI scan recommended to exclude occult   fractures or soft tissue injuries not evident on plain film radiography.    --- End of Report ---    STEVEN VAZQUEZ MD; Attending Radiologist  This document has been electronically signed. Dec  1 2023  3:55PM    ORT Score -   Family Hx of substance abuse	Female	      Male  Alcohol 	                                           1                     3  Illegal drugs	                                   2                     3  Rx drugs                                           4 	                  4  Personal Hx of substance abuse		  Alcohol 	                                          3	                  3  Illegal drugs                                     4	                  4  Rx drugs                                            5 	                  5  Age between 16- 45 years	           1                     1  hx preadolescent sexual abuse	   3 	                  0  Psychological disease		  ADD, OCD, bipolar, schizophrenia   2	          2  Depression                                           1 	          1  Total: 0    a score of 3 or lower indicates low risk for opioid abuse		  a score of 4-7 indicates moderate risk for opioid abuse		  a score of 8 or higher indicates high risk for opioid abuse  	  REVIEW OF SYSTEMS:  CONSTITUTIONAL: No fever or fatigue  HEENT:  No difficulty hearing, no change in vision  NECK: No pain or stiffness  RESPIRATORY: No cough, wheezing, chills or hemoptysis; No shortness of breath  CARDIOVASCULAR: No chest pain, palpitations, dizziness, or leg swelling  GASTROINTESTINAL: No loss of appetite, decreased PO intake. No abdominal or epigastric pain. No nausea, vomiting; No diarrhea or constipation.   GENITOURINARY: No dysuria, frequency, hematuria, retention or incontinence  MUSCULOSKELETAL: No joint pain or swelling; + lower backpain radiating to BLE, + lower motor strength weakness to BLE, no saddle anesthesia, bowel/bladder incontinence, no falls   NEURO: No headaches, No numbness/tingling b/l LE, No weakness  ENDOCRINE: No polyuria, polydipsia, heat or cold intolerance; No hair loss  PSYCHIATRIC: No depression, anxiety or difficulty sleeping    PHYSICAL EXAM:  GENERAL:  Alert & Oriented X4, cooperative, NAD, Good concentration. Speech is clear. Malagasy speaking.  RESPIRATORY: Respirations even and unlabored. Clear to auscultation bilaterally; No rales, rhonchi, wheezing, or rubs  CARDIOVASCULAR: Normal S1/S2, regular rate and rhythm; No murmurs, rubs, or gallops. No JVD. +Left upper PPM  GASTROINTESTINAL:  Soft, Nontender, Nondistended; Bowel sounds present, +obese per BMI  PERIPHERAL VASCULAR:  Extremities warm without edema. 2+ Peripheral Pulses, No cyanosis, No calf tenderness  MUSCULOSKELETAL: Motor Strength 5/5 B/L upper and 3/5 lower extremities; moves all extremities equally against gravity; ROM decrease to BLE due to pain; + SLR; + tenderness on palpation of lower back  SKIN: Warm, dry, intact. No rashes, lesions, or wounds. +scar on left upper chest and lowerback    Risk factors associated with adverse outcomes related to opioid treatment  [ ]  Concurrent benzodiazepine use  [ ]  History/ Active substance use or alcohol use disorder  [ ] Psychiatric co-morbidity  [ ] Sleep apnea  [ ] COPD  [x] BMI> 35  [ ] Liver dysfunction  [ ] Renal dysfunction  [ ] CHF  [ ] Smoker  [ ]  Age > 60 years    [x]  [place]  Reviewed and Copied to Chart. See below.    Plan of care and goal oriented pain management treatment options were discussed with patient and /or primary care giver; all questions and concerns were addressed and care was aligned with patient's wishes.    Educated patient on goal oriented pain management treatment options        Source of information: MALCOLM HYATT, Chart review  Patient language: Cuban  : n/a    HPI:  48-year-old male hx of HTN, hx of spinal surgery, symptomatic bradycardic s/p PPM, presenting with bilateral  lower back pain R>L for the past day. Was sitting yesterday around 2pm and got up  when the pain started. Felt an electric  shock down his legs and has been unable to walk since the pain started.  Pain is worse when he straightens his legs or tries to lift them. Better when he bends is his knees Took tramadol at home with no relief. . Denies recent fall or trauma,  saddle anesthesia, urinary or fecal incontinence  fever, chills, hx of cancer, abdominal pain or diarrhea.    Of note he had a pacemaker placed at [facility] two weeks ago.   (01 Dec 2023 11:29)    AP Pelvis : IMPRESSION:   No acute radiographic osseous pathology..  If pain persist despite conservative therapy and patient is unable to   walk, a follow-up noncontrast CT/MRI scan recommended to exclude occult   fractures or soft tissue injuries not evident on plain film radiography.    Pt is admitted for lower backpain radiating to BLE. Pt medicated with Flexeril, Toradol IV, Morphine IV and Oxycodone po in ED with minimal relief. Pain consulted for acute backpain 12/1/23 . Pt seen and examined at bedside in ED this afternoon, with wife at bedside. AP Pelvis -No acute radiographic osseous pathology. Patient found lying in stretcher, awake, alert and oriented x 3, speech clear. Pt is Cuban speaking, no  needed at this time. Pt reports lower backpain, 7/10 score, currently at rest. SCALE USED: (1-10 VNRS). Pt describes pain as constant, tight and shooting pain radiating to BLE, minimally alleviated by pain medication and exacerbated by movement. Pt stating having hx of spinal surgery on 2014 for compression fractures and hernia. Pt has been following MD Ledesma who has been doing injection therapy for his lower backpain every 4 months, last 4 months ago. Also, pt stating that MD Ledesma sent for radiology follow up and his next appointment to see him is 12/7/23. Pt also taking Tramadol 50 mg po q8h, last ordred on 7/26/23 per [facility]. Pt stating that he has been taking Tramadol 50 mg po 3x per week and taking more Tylenol for his moderate pain with some relief. Pt reports was doing fairly ok until yesterday, that while sitting in his living room got up and start having severe lower backpain with shooting electric pain to BLE and unable to stand up or walk. Pt tolerating PO diet. Denies lethargy, nausea, vomiting, constipation, itchiness. Reports last BM 11/30 yesterday. Patient stated goal for pain control: to be able to take deep breaths, get out of bed to chair and ambulate with tolerable pain control. Pt reports ambulating with cane at home baseline. Pending PT evaluation.    PAST MEDICAL & SURGICAL HISTORY:  HTN (hypertension)    Bradycardia    H/O Spinal surgery    FAMILY HISTORY:    Social History:   [x] Denies ETOH use, illicit drug use and smoking    Allergies    No Known Allergies    Intolerances    MEDICATIONS  (STANDING):  amLODIPine   Tablet 5 milliGRAM(s) Oral daily  enoxaparin Injectable 40 milliGRAM(s) SubCutaneous every 12 hours  gabapentin 200 milliGRAM(s) Oral daily  lidocaine   4% Patch 1 Patch Transdermal daily  methocarbamol 500 milliGRAM(s) Oral three times a day  naloxone Injectable 0.4 milliGRAM(s) IV Push once  polyethylene glycol 3350 17 Gram(s) Oral daily  senna 2 Tablet(s) Oral at bedtime    MEDICATIONS  (PRN):  acetaminophen     Tablet .. 650 milliGRAM(s) Oral every 6 hours PRN Temp greater or equal to 38C (100.4F), Mild Pain (1 - 3)  bisacodyl 5 milliGRAM(s) Oral daily PRN Constipation  traMADol 50 milliGRAM(s) Oral every 6 hours PRN Moderate Pain (4 - 6)    Vital Signs Last 24 Hrs  T(C): 36.7 (01 Dec 2023 12:22), Max: 37.2 (30 Nov 2023 22:05)  T(F): 98 (01 Dec 2023 12:22), Max: 99 (30 Nov 2023 22:05)  HR: 76 (01 Dec 2023 12:22) (65 - 82)  BP: 149/88 (01 Dec 2023 12:22) (126/65 - 152/83)  BP(mean): --  RR: 18 (01 Dec 2023 12:22) (18 - 20)  SpO2: 96% (01 Dec 2023 12:22) (95% - 96%)    Parameters below as of 01 Dec 2023 12:22  Patient On (Oxygen Delivery Method): room air    LABS: Reviewed.                        13.7   10.50 )-----------( 259      ( 01 Dec 2023 06:30 )             41.2     12-01    138  |  107  |  14  ----------------------------<  121<H>  3.8   |  28  |  0.81    Ca    8.5      01 Dec 2023 06:30    TPro  7.6  /  Alb  3.2<L>  /  TBili  0.4  /  DBili  x   /  AST  22  /  ALT  51  /  AlkPhos  87  12-01    LIVER FUNCTIONS - ( 01 Dec 2023 06:30 )  Alb: 3.2 g/dL / Pro: 7.6 g/dL / ALK PHOS: 87 U/L / ALT: 51 U/L DA / AST: 22 U/L / GGT: x           Urinalysis Basic - ( 01 Dec 2023 06:30 )    Color: x / Appearance: x / SG: x / pH: x  Gluc: 121 mg/dL / Ketone: x  / Bili: x / Urobili: x   Blood: x / Protein: x / Nitrite: x   Leuk Esterase: x / RBC: x / WBC x   Sq Epi: x / Non Sq Epi: x / Bacteria: x    CAPILLARY BLOOD GLUCOSE    Radiology:   ACC: [id number] EXAM:  XR PELVIS AP ONLY 1-2 VIEWS   ORDERED BY: FELECIA ZENG     PROCEDURE DATE:  12/01/2023      INTERPRETATION:  Radiograph of the pelvis    CLINICAL INFORMATION: Injury with Pain.    TECHNIQUE: AP pelvic view.    FINDINGS:  No prior similar studies are available for review.    Pelvic bones are intact.   No fracture or hip dislocation.  The sacroiliac joints and pubic symphysis are intact.   No pathologic calcifications.  Soft tissues unremarkable.    Bowel overlies and obscures portions of the sacrum and iliac bone.    IMPRESSION:   No acute radiographic osseous pathology..  If pain persist despite conservative therapy and patient is unable to   walk, a follow-up noncontrast CT/MRI scan recommended to exclude occult   fractures or soft tissue injuries not evident on plain film radiography.    --- End of Report ---    STEVEN VAZQUEZ MD; Attending Radiologist  This document has been electronically signed. Dec  1 2023  3:55PM    ORT Score -   Family Hx of substance abuse	Female	      Male  Alcohol 	                                           1                     3  Illegal drugs	                                   2                     3  Rx drugs                                           4 	                  4  Personal Hx of substance abuse		  Alcohol 	                                          3	                  3  Illegal drugs                                     4	                  4  Rx drugs                                            5 	                  5  Age between 16- 45 years	           1                     1  hx preadolescent sexual abuse	   3 	                  0  Psychological disease		  ADD, OCD, bipolar, schizophrenia   2	          2  Depression                                           1 	          1  Total: 0    a score of 3 or lower indicates low risk for opioid abuse		  a score of 4-7 indicates moderate risk for opioid abuse		  a score of 8 or higher indicates high risk for opioid abuse  	  REVIEW OF SYSTEMS:  CONSTITUTIONAL: No fever or fatigue  HEENT:  No difficulty hearing, no change in vision  NECK: No pain or stiffness  RESPIRATORY: No cough, wheezing, chills or hemoptysis; No shortness of breath  CARDIOVASCULAR: No chest pain, palpitations, dizziness, or leg swelling  GASTROINTESTINAL: No loss of appetite, decreased PO intake. No abdominal or epigastric pain. No nausea, vomiting; No diarrhea or constipation.   GENITOURINARY: No dysuria, frequency, hematuria, retention or incontinence  MUSCULOSKELETAL: No joint pain or swelling; + lower backpain radiating to BLE, + lower motor strength weakness to BLE, no saddle anesthesia, bowel/bladder incontinence, no falls   NEURO: No headaches, No numbness/tingling b/l LE, No weakness  ENDOCRINE: No polyuria, polydipsia, heat or cold intolerance; No hair loss  PSYCHIATRIC: No depression, anxiety or difficulty sleeping    PHYSICAL EXAM:  GENERAL:  Alert & Oriented X4, cooperative, NAD, Good concentration. Speech is clear. Cuban speaking.  RESPIRATORY: Respirations even and unlabored. Clear to auscultation bilaterally; No rales, rhonchi, wheezing, or rubs  CARDIOVASCULAR: Normal S1/S2, regular rate and rhythm; No murmurs, rubs, or gallops. No JVD. +Left upper PPM  GASTROINTESTINAL:  Soft, Nontender, Nondistended; Bowel sounds present, +obese per BMI  PERIPHERAL VASCULAR:  Extremities warm without edema. 2+ Peripheral Pulses, No cyanosis, No calf tenderness  MUSCULOSKELETAL: Motor Strength 5/5 B/L upper and 3/5 lower extremities; moves all extremities equally against gravity; ROM decrease to BLE due to pain; + SLR; + tenderness on palpation of lower back  SKIN: Warm, dry, intact. No rashes, lesions, or wounds. +scar on left upper chest and lowerback    Risk factors associated with adverse outcomes related to opioid treatment  [ ]  Concurrent benzodiazepine use  [ ]  History/ Active substance use or alcohol use disorder  [ ] Psychiatric co-morbidity  [ ] Sleep apnea  [ ] COPD  [x] BMI> 35  [ ] Liver dysfunction  [ ] Renal dysfunction  [ ] CHF  [ ] Smoker  [ ]  Age > 60 years    [x]  [place]  Reviewed and Copied to Chart. See below.    Plan of care and goal oriented pain management treatment options were discussed with patient and /or primary care giver; all questions and concerns were addressed and care was aligned with patient's wishes.    Educated patient on goal oriented pain management treatment options

## 2023-12-01 NOTE — ED ADULT NURSE REASSESSMENT NOTE - NS ED NURSE REASSESS COMMENT FT1
Late note due to pt care. Pt c/o 7/10 left low back pain, pain medication administered per MAR. Pt hx of back surgery in 2014 and pacemaker placed x2 weeks ago. Respirations even and unlabored, skin warm and dry. Pt endorses extreme pain when ambulating.

## 2023-12-01 NOTE — H&P ADULT - NSHPPHYSICALEXAM_GEN_ALL_CORE
T(C): 36.4 (12-01-23 @ 09:00), Max: 37.2 (11-30-23 @ 22:05)  HR: 82 (12-01-23 @ 09:00) (65 - 82)  BP: 152/83 (12-01-23 @ 09:00) (126/65 - 152/83)  RR: 18 (12-01-23 @ 09:00) (18 - 20)  SpO2: 95% (12-01-23 @ 09:00) (95% - 96%)    CONSTITUTIONAL: Well groomed, no apparent distress  EYES: PERRLA and symmetric, EOMI, No conjunctival or scleral injection, non-icteric   RESP: No respiratory distress, no use of accessory muscles; CTA b/l, no WRR  CV: RRR, +S1S2, no MRG; no JVD; no peripheral edema  GI: Soft, NT, ND, no rebound, no guarding; no palpable masses; no hepatosplenomegaly; no hernia palpated  MSK: + straight leg test, tenderness to bilateral lower back, worse on right.   SKIN: No rashes or ulcers noted; no subcutaneous nodules or induration palpable  PSYCH: Appropriate insight/judgment; A+O x 3, mood and affect appropriate, recent/remote memory intact

## 2023-12-01 NOTE — H&P ADULT - ASSESSMENT
48-year-old male hx of HTN, hx of spinal surgery, symptomatic bradycardic s/p PPM, presenting with bilateral  lower back pain R>L for the past day.  Patient is being admitted for intractable back pain.  48-year-old male hx of HTN, hx of spinal surgery, symptomatic bradycardic s/p PPM, presenting with bilateral  lower back pain R>L for the past day.  xray pelvis AP.  Patient is being admitted for intractable back pain.

## 2023-12-01 NOTE — H&P ADULT - PROBLEM SELECTOR PLAN 2
had a placemaker placed at [facility] 2 weeks ago  cardiologist is Dr. Pantoja- [phone]  follow up in two weeks
aching

## 2023-12-01 NOTE — ED ADULT NURSE NOTE - NSFALLRISKINTERV_ED_ALL_ED

## 2023-12-02 LAB
ANION GAP SERPL CALC-SCNC: 3 MMOL/L — LOW (ref 5–17)
ANION GAP SERPL CALC-SCNC: 3 MMOL/L — LOW (ref 5–17)
BUN SERPL-MCNC: 20 MG/DL — HIGH (ref 7–18)
BUN SERPL-MCNC: 20 MG/DL — HIGH (ref 7–18)
CALCIUM SERPL-MCNC: 8.4 MG/DL — SIGNIFICANT CHANGE UP (ref 8.4–10.5)
CALCIUM SERPL-MCNC: 8.4 MG/DL — SIGNIFICANT CHANGE UP (ref 8.4–10.5)
CHLORIDE SERPL-SCNC: 108 MMOL/L — SIGNIFICANT CHANGE UP (ref 96–108)
CHLORIDE SERPL-SCNC: 108 MMOL/L — SIGNIFICANT CHANGE UP (ref 96–108)
CO2 SERPL-SCNC: 28 MMOL/L — SIGNIFICANT CHANGE UP (ref 22–31)
CO2 SERPL-SCNC: 28 MMOL/L — SIGNIFICANT CHANGE UP (ref 22–31)
CREAT SERPL-MCNC: 0.81 MG/DL — SIGNIFICANT CHANGE UP (ref 0.5–1.3)
CREAT SERPL-MCNC: 0.81 MG/DL — SIGNIFICANT CHANGE UP (ref 0.5–1.3)
EGFR: 108 ML/MIN/1.73M2 — SIGNIFICANT CHANGE UP
EGFR: 108 ML/MIN/1.73M2 — SIGNIFICANT CHANGE UP
GLUCOSE SERPL-MCNC: 110 MG/DL — HIGH (ref 70–99)
GLUCOSE SERPL-MCNC: 110 MG/DL — HIGH (ref 70–99)
HCT VFR BLD CALC: 41.1 % — SIGNIFICANT CHANGE UP (ref 39–50)
HCT VFR BLD CALC: 41.1 % — SIGNIFICANT CHANGE UP (ref 39–50)
HGB BLD-MCNC: 13.7 G/DL — SIGNIFICANT CHANGE UP (ref 13–17)
HGB BLD-MCNC: 13.7 G/DL — SIGNIFICANT CHANGE UP (ref 13–17)
MAGNESIUM SERPL-MCNC: 2.1 MG/DL — SIGNIFICANT CHANGE UP (ref 1.6–2.6)
MAGNESIUM SERPL-MCNC: 2.1 MG/DL — SIGNIFICANT CHANGE UP (ref 1.6–2.6)
MCHC RBC-ENTMCNC: 28.5 PG — SIGNIFICANT CHANGE UP (ref 27–34)
MCHC RBC-ENTMCNC: 28.5 PG — SIGNIFICANT CHANGE UP (ref 27–34)
MCHC RBC-ENTMCNC: 33.3 GM/DL — SIGNIFICANT CHANGE UP (ref 32–36)
MCHC RBC-ENTMCNC: 33.3 GM/DL — SIGNIFICANT CHANGE UP (ref 32–36)
MCV RBC AUTO: 85.4 FL — SIGNIFICANT CHANGE UP (ref 80–100)
MCV RBC AUTO: 85.4 FL — SIGNIFICANT CHANGE UP (ref 80–100)
NRBC # BLD: 0 /100 WBCS — SIGNIFICANT CHANGE UP (ref 0–0)
NRBC # BLD: 0 /100 WBCS — SIGNIFICANT CHANGE UP (ref 0–0)
PHOSPHATE SERPL-MCNC: 3.5 MG/DL — SIGNIFICANT CHANGE UP (ref 2.5–4.5)
PHOSPHATE SERPL-MCNC: 3.5 MG/DL — SIGNIFICANT CHANGE UP (ref 2.5–4.5)
PLATELET # BLD AUTO: 254 K/UL — SIGNIFICANT CHANGE UP (ref 150–400)
PLATELET # BLD AUTO: 254 K/UL — SIGNIFICANT CHANGE UP (ref 150–400)
POTASSIUM SERPL-MCNC: 4 MMOL/L — SIGNIFICANT CHANGE UP (ref 3.5–5.3)
POTASSIUM SERPL-MCNC: 4 MMOL/L — SIGNIFICANT CHANGE UP (ref 3.5–5.3)
POTASSIUM SERPL-SCNC: 4 MMOL/L — SIGNIFICANT CHANGE UP (ref 3.5–5.3)
POTASSIUM SERPL-SCNC: 4 MMOL/L — SIGNIFICANT CHANGE UP (ref 3.5–5.3)
RBC # BLD: 4.81 M/UL — SIGNIFICANT CHANGE UP (ref 4.2–5.8)
RBC # BLD: 4.81 M/UL — SIGNIFICANT CHANGE UP (ref 4.2–5.8)
RBC # FLD: 12.8 % — SIGNIFICANT CHANGE UP (ref 10.3–14.5)
RBC # FLD: 12.8 % — SIGNIFICANT CHANGE UP (ref 10.3–14.5)
SODIUM SERPL-SCNC: 139 MMOL/L — SIGNIFICANT CHANGE UP (ref 135–145)
SODIUM SERPL-SCNC: 139 MMOL/L — SIGNIFICANT CHANGE UP (ref 135–145)
WBC # BLD: 9.8 K/UL — SIGNIFICANT CHANGE UP (ref 3.8–10.5)
WBC # BLD: 9.8 K/UL — SIGNIFICANT CHANGE UP (ref 3.8–10.5)
WBC # FLD AUTO: 9.8 K/UL — SIGNIFICANT CHANGE UP (ref 3.8–10.5)
WBC # FLD AUTO: 9.8 K/UL — SIGNIFICANT CHANGE UP (ref 3.8–10.5)

## 2023-12-02 PROCEDURE — 99232 SBSQ HOSP IP/OBS MODERATE 35: CPT

## 2023-12-02 RX ORDER — ACETAMINOPHEN 500 MG
1000 TABLET ORAL EVERY 8 HOURS
Refills: 0 | Status: DISCONTINUED | OUTPATIENT
Start: 2023-12-02 | End: 2023-12-03

## 2023-12-02 RX ADMIN — METHOCARBAMOL 750 MILLIGRAM(S): 500 TABLET, FILM COATED ORAL at 13:33

## 2023-12-02 RX ADMIN — GABAPENTIN 200 MILLIGRAM(S): 400 CAPSULE ORAL at 13:33

## 2023-12-02 RX ADMIN — ENOXAPARIN SODIUM 40 MILLIGRAM(S): 100 INJECTION SUBCUTANEOUS at 06:21

## 2023-12-02 RX ADMIN — Medication 1000 MILLIGRAM(S): at 12:00

## 2023-12-02 RX ADMIN — Medication 1000 MILLIGRAM(S): at 21:53

## 2023-12-02 RX ADMIN — AMLODIPINE BESYLATE 5 MILLIGRAM(S): 2.5 TABLET ORAL at 06:22

## 2023-12-02 RX ADMIN — GABAPENTIN 200 MILLIGRAM(S): 400 CAPSULE ORAL at 21:14

## 2023-12-02 RX ADMIN — Medication 10 MILLIGRAM(S): at 17:10

## 2023-12-02 RX ADMIN — Medication 1000 MILLIGRAM(S): at 03:07

## 2023-12-02 RX ADMIN — Medication 10 MILLIGRAM(S): at 00:46

## 2023-12-02 RX ADMIN — Medication 1000 MILLIGRAM(S): at 07:02

## 2023-12-02 RX ADMIN — METHOCARBAMOL 750 MILLIGRAM(S): 500 TABLET, FILM COATED ORAL at 06:21

## 2023-12-02 RX ADMIN — LIDOCAINE 1 PATCH: 4 CREAM TOPICAL at 23:55

## 2023-12-02 RX ADMIN — Medication 1000 MILLIGRAM(S): at 11:28

## 2023-12-02 RX ADMIN — Medication 10 MILLIGRAM(S): at 06:21

## 2023-12-02 RX ADMIN — Medication 1000 MILLIGRAM(S): at 00:42

## 2023-12-02 RX ADMIN — LIDOCAINE 1 PATCH: 4 CREAM TOPICAL at 19:31

## 2023-12-02 RX ADMIN — SENNA PLUS 2 TABLET(S): 8.6 TABLET ORAL at 21:15

## 2023-12-02 RX ADMIN — GABAPENTIN 200 MILLIGRAM(S): 400 CAPSULE ORAL at 06:22

## 2023-12-02 RX ADMIN — Medication 1000 MILLIGRAM(S): at 06:22

## 2023-12-02 RX ADMIN — LIDOCAINE 1 PATCH: 4 CREAM TOPICAL at 11:27

## 2023-12-02 RX ADMIN — Medication 1000 MILLIGRAM(S): at 21:14

## 2023-12-02 RX ADMIN — ENOXAPARIN SODIUM 40 MILLIGRAM(S): 100 INJECTION SUBCUTANEOUS at 16:48

## 2023-12-02 RX ADMIN — METHOCARBAMOL 750 MILLIGRAM(S): 500 TABLET, FILM COATED ORAL at 21:15

## 2023-12-02 RX ADMIN — POLYETHYLENE GLYCOL 3350 17 GRAM(S): 17 POWDER, FOR SOLUTION ORAL at 11:28

## 2023-12-02 NOTE — PROGRESS NOTE ADULT - SUBJECTIVE AND OBJECTIVE BOX
Source of information: MALCOLM HYATT, Chart review  Patient language: English  : n/a    HPI:  48-year-old male hx of HTN, hx of spinal surgery, symptomatic bradycardic s/p PPM, presenting with bilateral  lower back pain R>L for the past day. Was sitting yesterday around 2pm and got up  when the pain started. Felt an electric  shock down his legs and has been unable to walk since the pain started.  Pain is worse when he straightens his legs or tries to lift them. Better when he bends is his knees Took tramadol at home with no relief. . Denies recent fall or trauma,  saddle anesthesia, urinary or fecal incontinence  fever, chills, hx of cancer, abdominal pain or diarrhea.    Of note he had a pacemaker placed at [facility] two weeks ago.   (01 Dec 2023 11:29)    This is a Patient is a 48y old  male who presents with a chief complaint of back pain (01 Dec 2023 15:30)    Pt is admitted for lower back pain radiating to BLEs. Pt medicated with Flexeril, Toradol IV, Morphine IV and Oxycodone po in ED with minimal relief. Pain consulted for acute back pain 12/1/23 . Pt seen and examined at bedside while eating lunch. AP Pelvis -No acute radiographic osseous pathology. Pt reports lower back pain, 6/10 score, currently at rest. SCALE USED: (1-10 VNRS). Pt describes pain as constant, tight and shooting pain radiating to BLE, minimally alleviated by pain medication and exacerbated by movement and standing. Pt stating having hx of spinal surgery on 2014 for compression fractures and hernia. Pt has been following MD Ledesma - pain management () who has been doing injection therapy for his lower back pain every 4 months,  most recent being about 4- 5 months ago.  Pt states that he is scheduled to have more injection this Monday however the pain became too intense so he came to the hospital .  Today he states that pain is less however he is still unable to stand.  He also endorses tingling in both lower extremities but denies numbness.  Pt takes Tramadol 50 mg po q8h, last ordered on 7/26/23 per [facility]. Pt tolerating PO diet. Denies lethargy, nausea, vomiting, constipation, itchiness. Reports last BM 11/30 yesterday. Patient stated goal for pain control: to be able to take deep breaths, get out of bed to chair and ambulate with tolerable pain control. Pt reports ambulating with cane at home baseline.     PAST MEDICAL & SURGICAL HISTORY:  HTN (hypertension)    Bradycardia    H/O Spinal surgery      FAMILY HISTORY:    Social History:   [X]Denies ETOH use, illicit drug use, and smoking     Allergies    No Known Allergies     MEDICATIONS  (STANDING):  amLODIPine   Tablet 5 milliGRAM(s) Oral daily  enoxaparin Injectable 40 milliGRAM(s) SubCutaneous every 12 hours  gabapentin 200 milliGRAM(s) Oral every 8 hours  influenza   Vaccine 0.5 milliLiter(s) IntraMuscular once  ketorolac 10 milliGRAM(s) Oral every 8 hours  lidocaine   4% Patch 1 Patch Transdermal daily  methocarbamol 750 milliGRAM(s) Oral every 8 hours  naloxone Injectable 0.4 milliGRAM(s) IV Push once  polyethylene glycol 3350 17 Gram(s) Oral daily  senna 2 Tablet(s) Oral at bedtime    MEDICATIONS  (PRN):  bisacodyl 5 milliGRAM(s) Oral daily PRN Constipation  traMADol 50 milliGRAM(s) Oral every 8 hours PRN Severe Pain (7 - 10)      Vital Signs Last 24 Hrs  T(C): 36.6 (02 Dec 2023 13:07), Max: 36.8 (01 Dec 2023 19:45)  T(F): 97.9 (02 Dec 2023 13:07), Max: 98.2 (01 Dec 2023 19:45)  HR: 85 (02 Dec 2023 13:07) (65 - 85)  BP: 149/80 (02 Dec 2023 13:07) (133/77 - 159/89)  BP(mean): 108 (01 Dec 2023 19:45) (108 - 108)  RR: 18 (02 Dec 2023 13:07) (18 - 18)  SpO2: 97% (02 Dec 2023 13:07) (93% - 97%)    Parameters below as of 02 Dec 2023 13:07  Patient On (Oxygen Delivery Method): room air    LABS: Reviewed                          13.7   9.80  )-----------( 254      ( 02 Dec 2023 05:20 )             41.1     12-02    139  |  108  |  20<H>  ----------------------------<  110<H>  4.0   |  28  |  0.81    Ca    8.4      02 Dec 2023 05:20  Phos  3.5     12-02  Mg     2.1     12-02    TPro  7.6  /  Alb  3.2<L>  /  TBili  0.4  /  DBili  x   /  AST  22  /  ALT  51  /  AlkPhos  87  12-01      LIVER FUNCTIONS - ( 01 Dec 2023 06:30 )  Alb: 3.2 g/dL / Pro: 7.6 g/dL / ALK PHOS: 87 U/L / ALT: 51 U/L DA / AST: 22 U/L / GGT: x           Urinalysis Basic - ( 02 Dec 2023 05:20 )    Color: x / Appearance: x / SG: x / pH: x  Gluc: 110 mg/dL / Ketone: x  / Bili: x / Urobili: x   Blood: x / Protein: x / Nitrite: x   Leuk Esterase: x / RBC: x / WBC x   Sq Epi: x / Non Sq Epi: x / Bacteria: x      Radiology: Reviewed    < from: Xray Lumbar Spine AP + Lateral (12.01.23 @ 10:29) >    ACC: [id number] EXAM:  XR LS SPINE AP LAT 2-3 VIEWS   ORDERED BY: FELECIA ZENG     PROCEDURE DATE:  12/01/2023      INTERPRETATION:  INDICATION: 48-year-old male, evaluate for fracture.    COMPARISON: None    FINDINGS: 3 views of the lumbar spine. No compression fractures,   subluxations, or spondylolysis defects. Disk space heights preserved and   facet alignment maintained. Unremarkable SI joints and partially   visualized hips.    IMPRESSION: No fracture.    --- End of Report ---      FORTINO PROCTOR MD; Attending Interventional Radiologist  This document has been electronically signed. Dec  2 2023  8:46AM    < end of copied text >    ORT Score -   Family Hx of substance abuse	Female	      Male  Alcohol 	                                           1                     3  Illegal drugs	                                   2                     3  Rx drugs                                           4 	                  4  Personal Hx of substance abuse		  Alcohol 	                                          3	                  3  Illegal drugs                                     4	                  4  Rx drugs                                            5 	                  5  Age between 16- 45 years	           1                     1  hx preadolescent sexual abuse	   3 	                  0  Psychological disease		  ADD, OCD, bipolar, schizophrenia   2	          2  Depression                                           1 	          1  Total: 0    a score of 3 or lower indicates low risk for opioid abuse		  a score of 4-7 indicates moderate risk for opioid abuse		  a score of 8 or higher indicates high risk for opioid abuse    REVIEW OF SYSTEMS:  CONSTITUTIONAL: No fever or fatigue  HEENT:  No difficulty hearing, no change in vision  NECK: No pain or stiffness  RESPIRATORY: No cough, wheezing, chills or hemoptysis; No shortness of breath  CARDIOVASCULAR: No chest pain, palpitations, dizziness, or leg swelling  GASTROINTESTINAL: No loss of appetite, decreased PO intake. No abdominal or epigastric pain. No nausea, vomiting; No diarrhea or constipation.   GENITOURINARY: No dysuria, frequency, hematuria, retention or incontinence  MUSCULOSKELETAL: No joint pain or swelling; + lower back pain radiating to BLE, + lower motor strength weakness, no saddle anesthesia, bowel/bladder incontinence, no falls   NEURO: No headaches, No numbness/tingling b/l LE, No weakness  PSYCHIATRIC: No depression, anxiety or difficulty sleeping    PHYSICAL EXAM:  GENERAL:  Alert & Oriented X 4, cooperative, NAD, Good concentration. Speech is clear.   RESPIRATORY: Respirations even and unlabored. Clear to auscultation bilaterally; No rales, rhonchi, wheezing, or rubs  CARDIOVASCULAR: Normal S1/S2, regular rate and rhythm; No murmurs, rubs, or gallops. No JVD. +Left upper PPM  GASTROINTESTINAL:  Soft, Nontender, Nondistended; Bowel sounds present, +obese per BMI  PERIPHERAL VASCULAR:  Extremities warm without edema. 2+ Peripheral Pulses, No cyanosis, No calf tenderness  MUSCULOSKELETAL: Motor Strength 5/5 B/L upper and 3/5 lower extremities; moves all extremities equally against gravity; ROM decrease to BLE due to pain; + SLR; + tenderness on palpation of lower back  SKIN: Warm, dry, intact. No rashes, lesions, or wounds. +scar on left upper chest and lower back    Risk factors associated with adverse outcomes related to opioid treatment  [ ]  Concurrent benzodiazepine use  [ ]  History/ Active substance use or alcohol use disorder  [ ] Psychiatric co-morbidity  [ ] Sleep apnea  [ ] COPD  [x] BMI> 35  [ ] Liver dysfunction  [ ] Renal dysfunction  [ ] CHF  [ ] Smoker  [ ]  Age > 60 years    [x]  [place]  Reviewed and Copied to Chart. See below.      Plan of care and goal oriented pain management treatment options were discussed with patient and /or primary care giver; all questions and concerns were addressed and care was aligned with patient's wishes.    Educated patient on goal oriented pain management treatment options     12-02-23 @ 16:15    
    Patient is a 48y old  Male who presents with a chief complaint of back pain (01 Dec 2023 15:30)      INTERVAL HPI/OVERNIGHT EVENTS: no events noted overnight. He reports pain is a bit better but he is not even  able to walk to the bathroom     MEDICATIONS  (STANDING):  acetaminophen     Tablet .. 1000 milliGRAM(s) Oral every 8 hours  amLODIPine   Tablet 5 milliGRAM(s) Oral daily  enoxaparin Injectable 40 milliGRAM(s) SubCutaneous every 12 hours  gabapentin 200 milliGRAM(s) Oral every 8 hours  influenza   Vaccine 0.5 milliLiter(s) IntraMuscular once  ketorolac 10 milliGRAM(s) Oral every 8 hours  lidocaine   4% Patch 1 Patch Transdermal daily  methocarbamol 750 milliGRAM(s) Oral every 8 hours  naloxone Injectable 0.4 milliGRAM(s) IV Push once  polyethylene glycol 3350 17 Gram(s) Oral daily  senna 2 Tablet(s) Oral at bedtime    MEDICATIONS  (PRN):  bisacodyl 5 milliGRAM(s) Oral daily PRN Constipation  traMADol 50 milliGRAM(s) Oral every 8 hours PRN Severe Pain (7 - 10)      __________________________________________________  REVIEW OF SYSTEMS:    CONSTITUTIONAL: No fever,   EYES: no acute visual disturbances  NECK: No pain or stiffness  RESPIRATORY: No cough; No shortness of breath  CARDIOVASCULAR: No chest pain, no palpitations  GASTROINTESTINAL: No pain. No nausea or vomiting; No diarrhea   NEUROLOGICAL: No headache or numbness, no tremors  MUSCULOSKELETAL: back pain radiating to leg   GENITOURINARY: no dysuria, no frequency, no hesitancy  PSYCHIATRY: no depression , no anxiety  ALL OTHER  ROS negative        Vital Signs Last 24 Hrs  T(C): 36.6 (02 Dec 2023 13:07), Max: 36.8 (01 Dec 2023 19:45)  T(F): 97.9 (02 Dec 2023 13:07), Max: 98.2 (01 Dec 2023 19:45)  HR: 85 (02 Dec 2023 13:07) (65 - 85)  BP: 149/80 (02 Dec 2023 13:07) (133/77 - 159/89)  BP(mean): 108 (01 Dec 2023 19:45) (108 - 108)  RR: 18 (02 Dec 2023 13:07) (18 - 18)  SpO2: 97% (02 Dec 2023 13:07) (93% - 97%)    Parameters below as of 02 Dec 2023 13:07  Patient On (Oxygen Delivery Method): room air        ________________________________________________  PHYSICAL EXAM:  GENERAL: NAD  HEENT: Normocephalic;  conjunctivae and sclerae clear; moist mucous membranes;   NECK : supple  CHEST/LUNG: Clear to auscultation bilaterally with good air entry   HEART: S1 S2  regular; no murmurs, gallops or rubs  ABDOMEN: Soft, Nontender, Nondistended; Bowel sounds present  EXTREMITIES: no cyanosis; no edema; no calf tenderness  MSK: no back tenderness, ROM limited due to pain   SKIN: warm and dry; no rash  NERVOUS SYSTEM:  Awake and alert; Oriented  to place, person and time ; no new deficits    _________________________________________________  LABS:                        13.7   9.80  )-----------( 254      ( 02 Dec 2023 05:20 )             41.1     12-02    139  |  108  |  20<H>  ----------------------------<  110<H>  4.0   |  28  |  0.81    Ca    8.4      02 Dec 2023 05:20  Phos  3.5     12-02  Mg     2.1     12-02    TPro  7.6  /  Alb  3.2<L>  /  TBili  0.4  /  DBili  x   /  AST  22  /  ALT  51  /  AlkPhos  87  12-01      Urinalysis Basic - ( 02 Dec 2023 05:20 )    Color: x / Appearance: x / SG: x / pH: x  Gluc: 110 mg/dL / Ketone: x  / Bili: x / Urobili: x   Blood: x / Protein: x / Nitrite: x   Leuk Esterase: x / RBC: x / WBC x   Sq Epi: x / Non Sq Epi: x / Bacteria: x      CAPILLARY BLOOD GLUCOSE            RADIOLOGY & ADDITIONAL TESTS:    Imaging Personally Reviewed:  YES    Consultant(s) Notes Reviewed:   YES    Care Discussed with Consultants : YES     Plan of care was discussed with patient and /or primary care giver; all questions and concerns were addressed and care was aligned with patient's wishes.

## 2023-12-02 NOTE — PHYSICAL THERAPY INITIAL EVALUATION ADULT - PERTINENT HX OF CURRENT PROBLEM, REHAB EVAL
Pt. admitted from home due to sudden low back pain (10/10) that made it difficulty for patient to get up and to walk. Patient w/ PM placement recently. Patient w/ h/o spinal surgery in 2014.

## 2023-12-02 NOTE — PROGRESS NOTE ADULT - ASSESSMENT
Mr. Paiz is a 49 y/o male patient from home, ambulates independently w/ PMH of lumbar disc herniation s/p spinal surgery in 2014, HTN and symptomatic bradycardia s/p PPM 3 weeks ago p/w rt sided back pain x1 day. patient reports that he got up from the couch yesterday and experienced a sudden sharp shooting pain starting from his back radiating down to his Rt leg. Admitted for Acute lower back pain     A/P:  #Acute on Chronic Lower back pain   #Lumbar radiculopathy   #HTN  #s/p PPM   #H/o spinal surgery   #DVT ppx     Plan:  -Patient's symptoms are most consistent w/ acute back pain 2/2 lumbar herniation/ radiculopathy. No s/s of cord compression.   - C/w pain medications.   -C/w gabapentin, methocarbamol. Advised patient to get report of his out-patient MR spine   -Neuro spine consulted- Dr. Rodriguez   -PT joe noted- out-patient PT   -c/w  anti-HTN agents   -Lovenox SC.     
Confidential Drug Utilization Report  Search Terms: Ronni Paiz, 1975Search Date: 12/01/2023 15:34:05 PM  The Drug Utilization Report below displays all of the controlled substance prescriptions, if any, that your patient has filled in the last twelve months. The information displayed on this report is compiled from pharmacy submissions to the Department, and accurately reflects the information as submitted by the pharmacies.    This report was requested by: Megan Dave | Reference #: [id number]    You have not added a REGINALD number. Keeping your REGINALD number(s) up to date on the My REGINALD # tab will enable the separation of your prescriptions from others in the search results.    Practitioner Count: 0  Pharmacy Count: 0  Current Opioid Prescriptions: 0  Current Benzodiazepine Prescriptions: 0  Current Stimulant Prescriptions: 0      Patient Demographic Information (PDI)       PDI	First Name	Last Name	Birth Date	Gender	Street Address	[facility] Code  MICHELLE Paiz	1975	Male	[address]	[address]    Prescription Information      PDI Filter:    PDI	Current Rx	Drug Type	Rx Written	Rx Dispensed	Drug	Quantity	Days Supply	Prescriber Name	Prescriber REGINALD #	Payment Method	Dispenser  A	N	O	07/24/2023	07/26/2023	tramadol hcl 50 mg tablet	30	30	Andrew Ledesma	[id number]	Medicaid	[id number]  A	N	O	06/05/2023	06/13/2023	tramadol hcl 50 mg tablet	30	30	Baltazar Andrew	[id number]	Medicaid	[id number]  A	N	O	05/10/2023	05/11/2023	tramadol hcl 50 mg tablet	30	30	Baltazar, Andrew	[id number]	Medicaid	[id number]  A	N	O	01/07/2023	01/07/2023	tramadol hcl 50 mg tablet	30	30	Baltazar Anderw	[id number]	Medicaid	[id number]    * - Details of Drug Type : O = Opioid, B = Benzodiazepine, S = Stimulant    * - Drugs marked with an asterisk are compound drugs. If the compound drug is made up of more than one controlled substance, then each controlled substance will be a separate row in the table.

## 2023-12-02 NOTE — PROGRESS NOTE ADULT - PROBLEM SELECTOR PLAN 1
Pt with acute pain to lower back radiating to BLE which is nociceptive and neuropathic in nature likely due to radiculopathy. X-ray AP Pelvis- with No acute radiographic osseous pathology. F/U with CT/MRI recommended if unable to walk. Ortho spine consult recommended.   Opioid pain recommendations   - Continue Tramadol 50 mg PO q 8 hours PRN severe pain. Monitor for sedation/ respiratory depression.   Non-opioid pain recommendations   - Acetaminophen 1 gram PO q8h x 3 days. Monitor LFTs  - Robaxin to 750 mg q 8 hrs x 5 days. Monitor for sedation   - Lidoderm 4% patch daily. (12 hrs on/12 hrs off)  - Gabapentin 200 mg po q8h. Monitor renal function.  Bowel Regimen  - Continue Miralax 17G PO daily  - Continue Senna 2 tablets at bedtime for constipation.  Mild pain (score 1-3)  - Non-pharmacological pain treatment recommendations  - Warm/ Cool packs PRN   - Repositioning extremity, elevation, imagery, relaxation, distraction.  - Physical therapy OOB if no contraindications   Recommendations discussed with primary team and RN.

## 2023-12-03 ENCOUNTER — TRANSCRIPTION ENCOUNTER (OUTPATIENT)
Age: 48
End: 2023-12-03

## 2023-12-03 VITALS
SYSTOLIC BLOOD PRESSURE: 153 MMHG | OXYGEN SATURATION: 94 % | TEMPERATURE: 99 F | DIASTOLIC BLOOD PRESSURE: 91 MMHG | HEART RATE: 82 BPM | RESPIRATION RATE: 18 BRPM

## 2023-12-03 LAB
ANION GAP SERPL CALC-SCNC: 2 MMOL/L — LOW (ref 5–17)
ANION GAP SERPL CALC-SCNC: 2 MMOL/L — LOW (ref 5–17)
BUN SERPL-MCNC: 17 MG/DL — SIGNIFICANT CHANGE UP (ref 7–18)
BUN SERPL-MCNC: 17 MG/DL — SIGNIFICANT CHANGE UP (ref 7–18)
CALCIUM SERPL-MCNC: 8.5 MG/DL — SIGNIFICANT CHANGE UP (ref 8.4–10.5)
CALCIUM SERPL-MCNC: 8.5 MG/DL — SIGNIFICANT CHANGE UP (ref 8.4–10.5)
CHLORIDE SERPL-SCNC: 108 MMOL/L — SIGNIFICANT CHANGE UP (ref 96–108)
CHLORIDE SERPL-SCNC: 108 MMOL/L — SIGNIFICANT CHANGE UP (ref 96–108)
CO2 SERPL-SCNC: 29 MMOL/L — SIGNIFICANT CHANGE UP (ref 22–31)
CO2 SERPL-SCNC: 29 MMOL/L — SIGNIFICANT CHANGE UP (ref 22–31)
CREAT SERPL-MCNC: 0.98 MG/DL — SIGNIFICANT CHANGE UP (ref 0.5–1.3)
CREAT SERPL-MCNC: 0.98 MG/DL — SIGNIFICANT CHANGE UP (ref 0.5–1.3)
EGFR: 95 ML/MIN/1.73M2 — SIGNIFICANT CHANGE UP
EGFR: 95 ML/MIN/1.73M2 — SIGNIFICANT CHANGE UP
GLUCOSE SERPL-MCNC: 110 MG/DL — HIGH (ref 70–99)
GLUCOSE SERPL-MCNC: 110 MG/DL — HIGH (ref 70–99)
HCT VFR BLD CALC: 42.2 % — SIGNIFICANT CHANGE UP (ref 39–50)
HCT VFR BLD CALC: 42.2 % — SIGNIFICANT CHANGE UP (ref 39–50)
HGB BLD-MCNC: 14.1 G/DL — SIGNIFICANT CHANGE UP (ref 13–17)
HGB BLD-MCNC: 14.1 G/DL — SIGNIFICANT CHANGE UP (ref 13–17)
MAGNESIUM SERPL-MCNC: 2 MG/DL — SIGNIFICANT CHANGE UP (ref 1.6–2.6)
MAGNESIUM SERPL-MCNC: 2 MG/DL — SIGNIFICANT CHANGE UP (ref 1.6–2.6)
MCHC RBC-ENTMCNC: 27.9 PG — SIGNIFICANT CHANGE UP (ref 27–34)
MCHC RBC-ENTMCNC: 27.9 PG — SIGNIFICANT CHANGE UP (ref 27–34)
MCHC RBC-ENTMCNC: 33.4 GM/DL — SIGNIFICANT CHANGE UP (ref 32–36)
MCHC RBC-ENTMCNC: 33.4 GM/DL — SIGNIFICANT CHANGE UP (ref 32–36)
MCV RBC AUTO: 83.6 FL — SIGNIFICANT CHANGE UP (ref 80–100)
MCV RBC AUTO: 83.6 FL — SIGNIFICANT CHANGE UP (ref 80–100)
NRBC # BLD: 0 /100 WBCS — SIGNIFICANT CHANGE UP (ref 0–0)
NRBC # BLD: 0 /100 WBCS — SIGNIFICANT CHANGE UP (ref 0–0)
PHOSPHATE SERPL-MCNC: 3.8 MG/DL — SIGNIFICANT CHANGE UP (ref 2.5–4.5)
PHOSPHATE SERPL-MCNC: 3.8 MG/DL — SIGNIFICANT CHANGE UP (ref 2.5–4.5)
PLATELET # BLD AUTO: 266 K/UL — SIGNIFICANT CHANGE UP (ref 150–400)
PLATELET # BLD AUTO: 266 K/UL — SIGNIFICANT CHANGE UP (ref 150–400)
POTASSIUM SERPL-MCNC: 4.3 MMOL/L — SIGNIFICANT CHANGE UP (ref 3.5–5.3)
POTASSIUM SERPL-MCNC: 4.3 MMOL/L — SIGNIFICANT CHANGE UP (ref 3.5–5.3)
POTASSIUM SERPL-SCNC: 4.3 MMOL/L — SIGNIFICANT CHANGE UP (ref 3.5–5.3)
POTASSIUM SERPL-SCNC: 4.3 MMOL/L — SIGNIFICANT CHANGE UP (ref 3.5–5.3)
RBC # BLD: 5.05 M/UL — SIGNIFICANT CHANGE UP (ref 4.2–5.8)
RBC # BLD: 5.05 M/UL — SIGNIFICANT CHANGE UP (ref 4.2–5.8)
RBC # FLD: 12.8 % — SIGNIFICANT CHANGE UP (ref 10.3–14.5)
RBC # FLD: 12.8 % — SIGNIFICANT CHANGE UP (ref 10.3–14.5)
SODIUM SERPL-SCNC: 139 MMOL/L — SIGNIFICANT CHANGE UP (ref 135–145)
SODIUM SERPL-SCNC: 139 MMOL/L — SIGNIFICANT CHANGE UP (ref 135–145)
WBC # BLD: 10.83 K/UL — HIGH (ref 3.8–10.5)
WBC # BLD: 10.83 K/UL — HIGH (ref 3.8–10.5)
WBC # FLD AUTO: 10.83 K/UL — HIGH (ref 3.8–10.5)
WBC # FLD AUTO: 10.83 K/UL — HIGH (ref 3.8–10.5)

## 2023-12-03 PROCEDURE — 72170 X-RAY EXAM OF PELVIS: CPT

## 2023-12-03 PROCEDURE — 99285 EMERGENCY DEPT VISIT HI MDM: CPT

## 2023-12-03 PROCEDURE — 80053 COMPREHEN METABOLIC PANEL: CPT

## 2023-12-03 PROCEDURE — 85025 COMPLETE CBC W/AUTO DIFF WBC: CPT

## 2023-12-03 PROCEDURE — 96372 THER/PROPH/DIAG INJ SC/IM: CPT

## 2023-12-03 PROCEDURE — 84100 ASSAY OF PHOSPHORUS: CPT

## 2023-12-03 PROCEDURE — 83735 ASSAY OF MAGNESIUM: CPT

## 2023-12-03 PROCEDURE — 80048 BASIC METABOLIC PNL TOTAL CA: CPT

## 2023-12-03 PROCEDURE — 36415 COLL VENOUS BLD VENIPUNCTURE: CPT

## 2023-12-03 PROCEDURE — 96374 THER/PROPH/DIAG INJ IV PUSH: CPT

## 2023-12-03 PROCEDURE — 99239 HOSP IP/OBS DSCHRG MGMT >30: CPT

## 2023-12-03 PROCEDURE — 85027 COMPLETE CBC AUTOMATED: CPT

## 2023-12-03 PROCEDURE — 72100 X-RAY EXAM L-S SPINE 2/3 VWS: CPT

## 2023-12-03 RX ORDER — GABAPENTIN 400 MG/1
2 CAPSULE ORAL
Qty: 42 | Refills: 0
Start: 2023-12-03 | End: 2023-12-09

## 2023-12-03 RX ORDER — SENNA PLUS 8.6 MG/1
2 TABLET ORAL
Qty: 0 | Refills: 0 | DISCHARGE
Start: 2023-12-03

## 2023-12-03 RX ORDER — TRAMADOL HYDROCHLORIDE 50 MG/1
1 TABLET ORAL
Qty: 15 | Refills: 0
Start: 2023-12-03 | End: 2023-12-07

## 2023-12-03 RX ORDER — POLYETHYLENE GLYCOL 3350 17 G/17G
17 POWDER, FOR SOLUTION ORAL
Qty: 0 | Refills: 0 | DISCHARGE
Start: 2023-12-03

## 2023-12-03 RX ORDER — TRAMADOL HYDROCHLORIDE 50 MG/1
1 TABLET ORAL
Qty: 0 | Refills: 0 | DISCHARGE

## 2023-12-03 RX ORDER — METHOCARBAMOL 500 MG/1
1 TABLET, FILM COATED ORAL
Qty: 15 | Refills: 0
Start: 2023-12-03 | End: 2023-12-07

## 2023-12-03 RX ORDER — NALOXONE HYDROCHLORIDE 4 MG/.1ML
4 SPRAY NASAL
Qty: 1 | Refills: 0
Start: 2023-12-03

## 2023-12-03 RX ORDER — LIDOCAINE 4 G/100G
1 CREAM TOPICAL
Qty: 0 | Refills: 0 | DISCHARGE
Start: 2023-12-03

## 2023-12-03 RX ADMIN — Medication 1000 MILLIGRAM(S): at 06:03

## 2023-12-03 RX ADMIN — GABAPENTIN 200 MILLIGRAM(S): 400 CAPSULE ORAL at 06:04

## 2023-12-03 RX ADMIN — METHOCARBAMOL 750 MILLIGRAM(S): 500 TABLET, FILM COATED ORAL at 06:04

## 2023-12-03 RX ADMIN — ENOXAPARIN SODIUM 40 MILLIGRAM(S): 100 INJECTION SUBCUTANEOUS at 06:05

## 2023-12-03 RX ADMIN — LIDOCAINE 1 PATCH: 4 CREAM TOPICAL at 12:21

## 2023-12-03 RX ADMIN — METHOCARBAMOL 750 MILLIGRAM(S): 500 TABLET, FILM COATED ORAL at 14:01

## 2023-12-03 RX ADMIN — Medication 1000 MILLIGRAM(S): at 14:36

## 2023-12-03 RX ADMIN — GABAPENTIN 200 MILLIGRAM(S): 400 CAPSULE ORAL at 14:01

## 2023-12-03 RX ADMIN — POLYETHYLENE GLYCOL 3350 17 GRAM(S): 17 POWDER, FOR SOLUTION ORAL at 12:21

## 2023-12-03 RX ADMIN — AMLODIPINE BESYLATE 5 MILLIGRAM(S): 2.5 TABLET ORAL at 06:04

## 2023-12-03 RX ADMIN — Medication 1000 MILLIGRAM(S): at 14:01

## 2023-12-03 RX ADMIN — Medication 1000 MILLIGRAM(S): at 06:41

## 2023-12-03 NOTE — DISCHARGE NOTE NURSING/CASE MANAGEMENT/SOCIAL WORK - NSDCVIVACCINE_GEN_ALL_CORE_FT
influenza, injectable, quadrivalent, preservative free; 23-Oct-2021 09:35; Alejandra Palm (EREN); [employer]; [id number] (Exp. Date: 01-Jun-2022); IntraMuscular; Deltoid Right.; 0.5 milliLiter(s); VIS (VIS Published: 06-Aug-2021, VIS Presented: 23-Oct-2021);    influenza, injectable, quadrivalent, preservative free; 23-Oct-2021 09:35; Alejandra Palm (EREN); [facility]; [id number] (Exp. Date: 01-Jun-2022); IntraMuscular; Deltoid Right.; 0.5 milliLiter(s); VIS (VIS Published: 06-Aug-2021, VIS Presented: 23-Oct-2021);

## 2023-12-03 NOTE — DISCHARGE NOTE PROVIDER - NSTOBACCOUSAGEY/N_GEN_A_CS
Action 04/27/20 2:25 PM - Mary   Action Taken  Pathology received from [facility] and sent to be reviewed with filled out pathology form.      No

## 2023-12-03 NOTE — DISCHARGE NOTE NURSING/CASE MANAGEMENT/SOCIAL WORK - NSDCPEFALRISK_GEN_ALL_CORE
For information on Fall & Injury Prevention, visit: [email or web address] OR  [email or web address] OR  https://www.cdc.gov/steadi/patient.html For information on Fall & Injury Prevention, visit: [email or web address] OR  [email or web address] OR  https://www.cdc.gov/steadi/patient.html

## 2023-12-03 NOTE — DISCHARGE NOTE PROVIDER - NPI NUMBER (FOR SYSADMIN USE ONLY) :
[[id number]] [[id number]] [[id number]] [[id number]],[[id number]],[[id number]] [[id number]],[[id number]],[[id number]] [[id number]],[[id number]]

## 2023-12-03 NOTE — DISCHARGE NOTE PROVIDER - ATTENDING DISCHARGE PHYSICAL EXAMINATION:
Vital Signs Last 24 Hrs  T(C): 37 (03 Dec 2023 12:45), Max: 37 (03 Dec 2023 12:45)  T(F): 98.6 (03 Dec 2023 12:45), Max: 98.6 (03 Dec 2023 12:45)  HR: 82 (03 Dec 2023 12:45) (64 - 85)  BP: 153/91 (03 Dec 2023 12:45) (132/81 - 153/91)  BP(mean): 93 (03 Dec 2023 05:20) (93 - 103)  RR: 18 (03 Dec 2023 12:45) (18 - 18)  SpO2: 94% (03 Dec 2023 12:45) (94% - 95%)    Parameters below as of 03 Dec 2023 12:45  Patient On (Oxygen Delivery Method): room air    GENERAL: NAD  HEENT: Normocephalic;  conjunctivae and sclerae clear; moist mucous membranes;   NECK : supple  CHEST/LUNG: Clear to auscultation bilaterally with good air entry   HEART: S1 S2  regular; no murmurs, gallops or rubs  ABDOMEN: Soft, Nontender, Nondistended; Bowel sounds present  EXTREMITIES: no cyanosis; no edema; no calf tenderness  MSK: no back tenderness, ROM limited due to pain   SKIN: warm and dry; no rash  NERVOUS SYSTEM:  Awake and alert; Oriented  to place, person and time ; no new deficits

## 2023-12-03 NOTE — DISCHARGE NOTE PROVIDER - NSDCCPCAREPLAN_GEN_ALL_CORE_FT
PRINCIPAL DISCHARGE DIAGNOSIS  Diagnosis: Intractable back pain  Assessment and Plan of Treatment: you presented to hospital with worsening back pain which was likley due to your history of disc herniation, you were evaluated by Pain management team and your medications were adjusted   Follow up with Pain specialist and Neurosurgery outpatient for further follow up and recommendations      SECONDARY DISCHARGE DIAGNOSES  Diagnosis: HTN (hypertension)  Assessment and Plan of Treatment: Take all medication as prescribed.  Follow up with your medical doctor for routine blood pressure monitoring at your next visit.  Notify your doctor if you have any of the following symptoms:   Dizziness, Lightheadedness, Blurry vision, Headache, Chest pain, Shortness of breath      Diagnosis: Symptomatic bradycardia  Assessment and Plan of Treatment: you have a Pacemaker, follow up with Cardiologist for further follow up        PRINCIPAL DISCHARGE DIAGNOSIS  Diagnosis: Intractable back pain  Assessment and Plan of Treatment: you presented to hospital with worsening back pain which was likley due to your history of disc herniation, you were evaluated by Pain management team and your medications were adjusted   Follow up with Pain specialist and Neurosurgery outpatient for further follow up and recommendations  -You are given pain medications including tramadol which is an opiod medication. Narcan (naloxone) is also provided as an opiod reversal agent in case of opiod overdose. Please call 911 and viist ED emergently in case of opioid overdose.      SECONDARY DISCHARGE DIAGNOSES  Diagnosis: Lumbar radiculopathy  Assessment and Plan of Treatment: Care plan as above.    Diagnosis: HTN (hypertension)  Assessment and Plan of Treatment: Take all medication as prescribed.  Follow up with your medical doctor for routine blood pressure monitoring at your next visit.  Notify your doctor if you have any of the following symptoms:   Dizziness, Lightheadedness, Blurry vision, Headache, Chest pain, Shortness of breath      Diagnosis: Symptomatic bradycardia  Assessment and Plan of Treatment: you have a Pacemaker, follow up with Cardiologist for further follow up

## 2023-12-03 NOTE — DISCHARGE NOTE PROVIDER - NSDCMRMEDTOKEN_GEN_ALL_CORE_FT
amLODIPine 5 mg oral tablet: 1 tab(s) orally once a day  cilostazol 50 mg oral tablet: 1 tab(s) orally once a day  naproxen 500 mg oral tablet: 1 tab(s) orally once a day  traMADol 50 mg oral tablet: 1 tab(s) orally 2 times a day, As Needed   amLODIPine 5 mg oral tablet: 1 tab(s) orally once a day  cilostazol 50 mg oral tablet: 1 tab(s) orally once a day  gabapentin 100 mg oral capsule: 2 cap(s) orally every 8 hours  lidocaine 4% topical film: Apply topically to affected area once a day  methocarbamol 750 mg oral tablet: 1 tab(s) orally every 8 hours MDD: 3 tabs  Outpatient Physical Therapy: once a day  polyethylene glycol 3350 oral powder for reconstitution: 17 gram(s) orally once a day  senna leaf extract oral tablet: 2 tab(s) orally once a day (at bedtime)   amLODIPine 5 mg oral tablet: 1 tab(s) orally once a day  cilostazol 50 mg oral tablet: 1 tab(s) orally once a day  gabapentin 100 mg oral capsule: 2 cap(s) orally every 8 hours  lidocaine 4% topical film: Apply topically to affected area once a day  methocarbamol 750 mg oral tablet: 1 tab(s) orally every 8 hours MDD: 3 tabs  naloxone 4 mg/0.1 mL nasal spray: 4 milligram(s) intranasally once a day 1 spray given in one nostril as a single dose; may repeat every 2-3 minutes in alternating nostrils if patient does not respond s initially.  Outpatient Physical Therapy: once a day  polyethylene glycol 3350 oral powder for reconstitution: 17 gram(s) orally once a day  senna leaf extract oral tablet: 2 tab(s) orally once a day (at bedtime)  traMADol 50 mg oral tablet: 1 tab(s) orally every 8 hours as needed for Severe Pain (7 - 10) MDD: 3 tabs

## 2023-12-03 NOTE — DISCHARGE NOTE PROVIDER - HOSPITAL COURSE
Mr. Paiz is a 49 y/o male patient from home, ambulates independently w/ PMH of lumbar disc herniation s/p spinal surgery in 2014, HTN and symptomatic bradycardia s/p PPM 3 weeks ago p/w rt sided back pain x1 day. patient reports that he got up from the couch yesterday and experienced a sudden sharp shooting pain starting from his back radiating down to his Rt leg. Admitted for Acute lower back pain     A/P:  #Acute on Chronic Lower back pain   #Lumbar radiculopathy   #HTN  #s/p PPM   #H/o spinal surgery   #DVT ppx Mr. Paiz is a 47 y/o male patient from home, ambulates independently w/ PMH of lumbar disc herniation s/p spinal surgery in 2014, HTN and symptomatic bradycardia s/p PPM 3 weeks ago p/w rt sided back pain x1 day. patient reports that he got up from the couch yesterday and experienced a sudden sharp shooting pain starting from his back radiating down to his Rt leg. Admitted for Acute lower back pain     A/P:  #Acute on Chronic Lower back pain   #Lumbar radiculopathy   #HTN  #s/p PPM   #H/o spinal surgery   #DVT ppx Mr. Paiz is a 49 y/o male patient from home, ambulates independently w/ PMH of lumbar disc herniation s/p spinal surgery in 2014, HTN and symptomatic bradycardia s/p PPM 3 weeks ago p/w rt sided back pain x1 day. patient reports that he got up from the couch yesterday and experienced a sudden sharp shooting pain starting from his back radiating down to his Rt leg. Admitted for Acute lower back pain. Xray pelvis negative acute back pain likely 2/2 lumbar herniation/ radiculopathy. No s/s of cord compression.   Pain mgmt evaluated, PT recommended outpt PT   Clinically improved, optimized for discharge with outpt follow up   Please note that this a brief summary of hospital course please refer to daily progress notes and consult notes for full course and events Mr. Paiz is a 47 y/o male patient from home, ambulates independently w/ PMH of lumbar disc herniation s/p spinal surgery in 2014, HTN and symptomatic bradycardia s/p PPM 3 weeks ago p/w rt sided back pain x1 day. patient reports that he got up from the couch yesterday and experienced a sudden sharp shooting pain starting from his back radiating down to his Rt leg. Admitted for Acute lower back pain. Xray pelvis negative acute back pain likely 2/2 lumbar herniation/ radiculopathy. No s/s of cord compression.   Pain mgmt evaluated, PT recommended outpt PT   Clinically improved, optimized for discharge with outpt follow up   Please note that this a brief summary of hospital course please refer to daily progress notes and consult notes for full course and events

## 2023-12-03 NOTE — DISCHARGE NOTE NURSING/CASE MANAGEMENT/SOCIAL WORK - PATIENT PORTAL LINK FT
You can access the FollowMyHealth Patient Portal offered by [facility] by registering at the following website: [email or web address]. By joining [employer]’s FollowMyHealth portal, you will also be able to view your health information using other applications (apps) compatible with our system. You can access the FollowMyHealth Patient Portal offered by [facility] by registering at the following website: [email or web address]. By joining [employer]’s FollowMyHealth portal, you will also be able to view your health information using other applications (apps) compatible with our system.

## 2023-12-03 NOTE — DISCHARGE NOTE PROVIDER - CARE PROVIDER_API CALL
Fabrice Dos Santos  Neurosurgery  [address] [facility], [address]  [place] [address]  Phone: [phone]  Fax: [phone]  Follow Up Time: 1 week   Fabrice Dos Santos  Neurosurgery  [address] [facility], [address]  [place] [address]  Phone: [phone]  Fax: [phone]  Follow Up Time: 1 week   Fabrice Dos Santos  Neurosurgery  [address] [facility], [address]  [place] [address]  Phone: [phone]  Fax: [phone]  Follow Up Time: 1 week   Fabrice Dos Santos  Neurosurgery  [address] [facility], [address]  [place] [address]  Phone: [phone]  Fax: [phone]  Follow Up Time: 1 week    Andrew Ledesma  Physical/Rehab Medicine  [address] [place] [address], [address]  [place] [address]  Phone: [phone]  Fax: [phone]  Follow Up Time: 1-3 days    Boy Fontana  [address], [address]  [place] [address]  Phone: [phone]  Fax: [phone]  Follow Up Time: 1 week   Fabrice Dos Santos  Neurosurgery  [address] [facility], [address]  [place] [address]  Phone: [phone]  Fax: [phone]  Follow Up Time: 1 week    Andrew Ledesma  Physical/Rehab Medicine  [address] [place] [address], [address]  [place] [address]  Phone: [phone]  Fax: [phone]  Follow Up Time: 1-3 days    Boy Fontana  [address], [address]  [place] [address]  Phone: [phone]  Fax: [phone]  Follow Up Time: 1 week   Fabrice Dos Santos  Neurosurgery  [address] [facility], [address]  [place] [address]  Phone: [phone]  Fax: [phone]  Follow Up Time: 1 week    Andrew Ledesma  Physical/Rehab Medicine  [address] [place] [address], [address]  [place] [address]  Phone: [phone]  Fax: [phone]  Follow Up Time: 1-3 days    Boy Fontana  [address], [address]  [place] [address]  Phone: [phone]  Fax: [phone]  Follow Up Time: 1 week

## 2023-12-03 NOTE — DISCHARGE NOTE PROVIDER - PROVIDER TOKENS
PROVIDER:[TOKEN:[[id number]],FOLLOWUP:[1 week]] PROVIDER:[TOKEN:[[id number]],FOLLOWUP:[1 week]] PROVIDER:[TOKEN:[[id number]],FOLLOWUP:[1 week]] PROVIDER:[TOKEN:[[id number]],FOLLOWUP:[1 week]],PROVIDER:[TOKEN:[[id number]],FOLLOWUP:[1-3 days]],PROVIDER:[TOKEN:[[id number]],FOLLOWUP:[1 week]] PROVIDER:[TOKEN:[[id number]],FOLLOWUP:[1 week]],PROVIDER:[TOKEN:[[id number]],FOLLOWUP:[1-3 days]],PROVIDER:[TOKEN:[[id number]],FOLLOWUP:[1 week]] PROVIDER:[TOKEN:[[id number]],FOLLOWUP:[1 week]],PROVIDER:[TOKEN:[[id number]],FOLLOWUP:[1-3 days]],PROVIDER:[TOKEN:[[id number]],FOLLOWUP:[1 week]]

## 2025-02-28 NOTE — SBIRT NOTE ADULT - NSSBIRTSCREENAVAIL_GEN_A_CORE
In an effort to ensure that our patients [employer], a Team Member has reviewed your chart and identified an opportunity to provide the best care possible. An attempt was made to discuss or schedule due or overdue Preventive or Chronic Condition care.Care Gaps identified: Diabetes Eye Exam.    The Outcome was Contact was not made, no answer/busy. We are attempting to schedule a Eye Exam appointment. If you have any questions or need help with scheduling, contact our Health Outreach Team at [phone].   Type of Appointment needed:  eye exam    Yes